# Patient Record
Sex: MALE | Race: BLACK OR AFRICAN AMERICAN | Employment: FULL TIME | URBAN - METROPOLITAN AREA
[De-identification: names, ages, dates, MRNs, and addresses within clinical notes are randomized per-mention and may not be internally consistent; named-entity substitution may affect disease eponyms.]

---

## 2019-06-20 ENCOUNTER — HOSPITAL ENCOUNTER (INPATIENT)
Age: 49
LOS: 16 days | Discharge: NURSING, REHAB OR LONG-TERM CARE | DRG: 003 | End: 2019-07-06
Attending: EMERGENCY MEDICINE | Admitting: INTERNAL MEDICINE
Payer: COMMERCIAL

## 2019-06-20 ENCOUNTER — APPOINTMENT (OUTPATIENT)
Dept: CT IMAGING | Age: 49
DRG: 003 | End: 2019-06-20
Payer: COMMERCIAL

## 2019-06-20 ENCOUNTER — APPOINTMENT (OUTPATIENT)
Dept: GENERAL RADIOLOGY | Age: 49
DRG: 003 | End: 2019-06-20
Payer: COMMERCIAL

## 2019-06-20 DIAGNOSIS — I21.3 ST ELEVATION MYOCARDIAL INFARCTION (STEMI), UNSPECIFIED ARTERY (HCC): ICD-10-CM

## 2019-06-20 DIAGNOSIS — I46.9 CARDIAC ARREST (HCC): Primary | ICD-10-CM

## 2019-06-20 LAB
A/G RATIO: 1.3 (ref 1.1–2.2)
ALBUMIN SERPL-MCNC: 4.6 G/DL (ref 3.4–5)
ALP BLD-CCNC: 68 U/L (ref 40–129)
ALT SERPL-CCNC: 101 U/L (ref 10–40)
ANION GAP SERPL CALCULATED.3IONS-SCNC: 43 MMOL/L (ref 3–16)
APTT: 40.1 SEC (ref 26–36)
AST SERPL-CCNC: 107 U/L (ref 15–37)
BANDED NEUTROPHILS RELATIVE PERCENT: 4 % (ref 0–7)
BASE EXCESS ARTERIAL: -28.4 MMOL/L (ref -3–3)
BASE EXCESS ARTERIAL: -7.4 MMOL/L (ref -3–3)
BASOPHILS ABSOLUTE: 0 K/UL (ref 0–0.2)
BASOPHILS ABSOLUTE: 0 K/UL (ref 0–0.2)
BASOPHILS RELATIVE PERCENT: 0 %
BASOPHILS RELATIVE PERCENT: 0.2 %
BILIRUB SERPL-MCNC: 0.5 MG/DL (ref 0–1)
BUN BLDV-MCNC: 14 MG/DL (ref 7–20)
CALCIUM SERPL-MCNC: 9.8 MG/DL (ref 8.3–10.6)
CARBOXYHEMOGLOBIN ARTERIAL: 0 % (ref 0–1.5)
CARBOXYHEMOGLOBIN ARTERIAL: 0.2 % (ref 0–1.5)
CHLORIDE BLD-SCNC: 94 MMOL/L (ref 99–110)
CO2: 8 MMOL/L (ref 21–32)
CREAT SERPL-MCNC: 1.5 MG/DL (ref 0.9–1.3)
EOSINOPHILS ABSOLUTE: 0 K/UL (ref 0–0.6)
EOSINOPHILS ABSOLUTE: 0.4 K/UL (ref 0–0.6)
EOSINOPHILS RELATIVE PERCENT: 0.2 %
EOSINOPHILS RELATIVE PERCENT: 5 %
GFR AFRICAN AMERICAN: >60
GFR NON-AFRICAN AMERICAN: 50
GLOBULIN: 3.5 G/DL
GLUCOSE BLD-MCNC: 309 MG/DL (ref 70–99)
HCO3 ARTERIAL: 22.3 MMOL/L (ref 21–29)
HCO3 ARTERIAL: 8.7 MMOL/L (ref 21–29)
HCT VFR BLD CALC: 46.3 % (ref 40.5–52.5)
HCT VFR BLD CALC: 48.9 % (ref 40.5–52.5)
HEMOGLOBIN, ART, EXTENDED: 14.9 G/DL (ref 13.5–17.5)
HEMOGLOBIN, ART, EXTENDED: 16.3 G/DL (ref 13.5–17.5)
HEMOGLOBIN: 15.4 G/DL (ref 13.5–17.5)
HEMOGLOBIN: 15.5 G/DL (ref 13.5–17.5)
INR BLD: 1.05 (ref 0.86–1.14)
LACTIC ACID: 26.4 MMOL/L (ref 0.4–2)
LEFT VENTRICULAR EJECTION FRACTION MODE: NORMAL
LV EF: 35 %
LYMPHOCYTES ABSOLUTE: 0.8 K/UL (ref 1–5.1)
LYMPHOCYTES ABSOLUTE: 4.8 K/UL (ref 1–5.1)
LYMPHOCYTES RELATIVE PERCENT: 5.6 %
LYMPHOCYTES RELATIVE PERCENT: 65 %
MAGNESIUM: 3.4 MG/DL (ref 1.8–2.4)
MCH RBC QN AUTO: 31 PG (ref 26–34)
MCH RBC QN AUTO: 31.9 PG (ref 26–34)
MCHC RBC AUTO-ENTMCNC: 31.5 G/DL (ref 31–36)
MCHC RBC AUTO-ENTMCNC: 33.5 G/DL (ref 31–36)
MCV RBC AUTO: 101.3 FL (ref 80–100)
MCV RBC AUTO: 92.4 FL (ref 80–100)
METAMYELOCYTES RELATIVE PERCENT: 2 %
METHEMOGLOBIN ARTERIAL: 1 %
METHEMOGLOBIN ARTERIAL: 1.2 %
MONOCYTES ABSOLUTE: 0.4 K/UL (ref 0–1.3)
MONOCYTES ABSOLUTE: 1.2 K/UL (ref 0–1.3)
MONOCYTES RELATIVE PERCENT: 5 %
MONOCYTES RELATIVE PERCENT: 8.1 %
NEUTROPHILS ABSOLUTE: 1.9 K/UL (ref 1.7–7.7)
NEUTROPHILS ABSOLUTE: 13 K/UL (ref 1.7–7.7)
NEUTROPHILS RELATIVE PERCENT: 19 %
NEUTROPHILS RELATIVE PERCENT: 85.9 %
O2 CONTENT ARTERIAL: 15 ML/DL
O2 CONTENT ARTERIAL: 18 ML/DL
O2 SAT, ARTERIAL: 64.9 %
O2 SAT, ARTERIAL: 85.5 %
O2 THERAPY: ABNORMAL
O2 THERAPY: ABNORMAL
PCO2 ARTERIAL: 59.4 MMHG (ref 35–45)
PCO2 ARTERIAL: 62.3 MMHG (ref 35–45)
PDW BLD-RTO: 13.1 % (ref 12.4–15.4)
PDW BLD-RTO: 13.9 % (ref 12.4–15.4)
PH ARTERIAL: 6.8 (ref 7.35–7.45)
PH ARTERIAL: 7.18 (ref 7.35–7.45)
PLATELET # BLD: 234 K/UL (ref 135–450)
PLATELET # BLD: 334 K/UL (ref 135–450)
PLATELET SLIDE REVIEW: ADEQUATE
PMV BLD AUTO: 8.3 FL (ref 5–10.5)
PMV BLD AUTO: 9.2 FL (ref 5–10.5)
PO2 ARTERIAL: 43.3 MMHG (ref 75–108)
PO2 ARTERIAL: 93.7 MMHG (ref 75–108)
POC ACT LR: 288 SEC
POLYCHROMASIA: ABNORMAL
POTASSIUM REFLEX MAGNESIUM: 3.1 MMOL/L (ref 3.5–5.1)
PROTHROMBIN TIME: 12 SEC (ref 9.8–13)
RBC # BLD: 4.83 M/UL (ref 4.2–5.9)
RBC # BLD: 5.01 M/UL (ref 4.2–5.9)
SMUDGE CELLS: PRESENT
SODIUM BLD-SCNC: 145 MMOL/L (ref 136–145)
TCO2 ARTERIAL: 10.6 MMOL/L
TCO2 ARTERIAL: 24.2 MMOL/L
TOTAL PROTEIN: 8.1 G/DL (ref 6.4–8.2)
TROPONIN: <0.01 NG/ML
WBC # BLD: 15.2 K/UL (ref 4–11)
WBC # BLD: 7.4 K/UL (ref 4–11)

## 2019-06-20 PROCEDURE — 83735 ASSAY OF MAGNESIUM: CPT

## 2019-06-20 PROCEDURE — 6360000002 HC RX W HCPCS

## 2019-06-20 PROCEDURE — 92941 PRQ TRLML REVSC TOT OCCL AMI: CPT

## 2019-06-20 PROCEDURE — 36592 COLLECT BLOOD FROM PICC: CPT

## 2019-06-20 PROCEDURE — 93458 L HRT ARTERY/VENTRICLE ANGIO: CPT

## 2019-06-20 PROCEDURE — 2100000000 HC CCU R&B

## 2019-06-20 PROCEDURE — 96374 THER/PROPH/DIAG INJ IV PUSH: CPT

## 2019-06-20 PROCEDURE — 92973 PRQ TRLUML C MCHN ASP THRMBC: CPT

## 2019-06-20 PROCEDURE — 2580000003 HC RX 258: Performed by: EMERGENCY MEDICINE

## 2019-06-20 PROCEDURE — 80053 COMPREHEN METABOLIC PANEL: CPT

## 2019-06-20 PROCEDURE — 2500000003 HC RX 250 WO HCPCS: Performed by: EMERGENCY MEDICINE

## 2019-06-20 PROCEDURE — 82803 BLOOD GASES ANY COMBINATION: CPT

## 2019-06-20 PROCEDURE — 94002 VENT MGMT INPAT INIT DAY: CPT

## 2019-06-20 PROCEDURE — 2709999900 HC NON-CHARGEABLE SUPPLY

## 2019-06-20 PROCEDURE — 84484 ASSAY OF TROPONIN QUANT: CPT

## 2019-06-20 PROCEDURE — 2580000003 HC RX 258: Performed by: INTERNAL MEDICINE

## 2019-06-20 PROCEDURE — 33990 INSJ PERQ VAD L HRT ARTERIAL: CPT

## 2019-06-20 PROCEDURE — 96375 TX/PRO/DX INJ NEW DRUG ADDON: CPT

## 2019-06-20 PROCEDURE — 2580000003 HC RX 258

## 2019-06-20 PROCEDURE — 93005 ELECTROCARDIOGRAM TRACING: CPT | Performed by: EMERGENCY MEDICINE

## 2019-06-20 PROCEDURE — 36415 COLL VENOUS BLD VENIPUNCTURE: CPT

## 2019-06-20 PROCEDURE — 31500 INSERT EMERGENCY AIRWAY: CPT

## 2019-06-20 PROCEDURE — 36556 INSERT NON-TUNNEL CV CATH: CPT

## 2019-06-20 PROCEDURE — 85610 PROTHROMBIN TIME: CPT

## 2019-06-20 PROCEDURE — 6370000000 HC RX 637 (ALT 250 FOR IP)

## 2019-06-20 PROCEDURE — 83605 ASSAY OF LACTIC ACID: CPT

## 2019-06-20 PROCEDURE — C1887 CATHETER, GUIDING: HCPCS

## 2019-06-20 PROCEDURE — 99222 1ST HOSP IP/OBS MODERATE 55: CPT | Performed by: INTERNAL MEDICINE

## 2019-06-20 PROCEDURE — 02HA3RZ INSERTION OF SHORT-TERM EXTERNAL HEART ASSIST SYSTEM INTO HEART, PERCUTANEOUS APPROACH: ICD-10-PCS | Performed by: INTERNAL MEDICINE

## 2019-06-20 PROCEDURE — 6360000004 HC RX CONTRAST MEDICATION: Performed by: EMERGENCY MEDICINE

## 2019-06-20 PROCEDURE — 2500000003 HC RX 250 WO HCPCS

## 2019-06-20 PROCEDURE — 70450 CT HEAD/BRAIN W/O DYE: CPT

## 2019-06-20 PROCEDURE — C1894 INTRO/SHEATH, NON-LASER: HCPCS

## 2019-06-20 PROCEDURE — B2151ZZ FLUOROSCOPY OF LEFT HEART USING LOW OSMOLAR CONTRAST: ICD-10-PCS | Performed by: INTERNAL MEDICINE

## 2019-06-20 PROCEDURE — 85730 THROMBOPLASTIN TIME PARTIAL: CPT

## 2019-06-20 PROCEDURE — 6360000002 HC RX W HCPCS: Performed by: EMERGENCY MEDICINE

## 2019-06-20 PROCEDURE — 99152 MOD SED SAME PHYS/QHP 5/>YRS: CPT

## 2019-06-20 PROCEDURE — C1769 GUIDE WIRE: HCPCS

## 2019-06-20 PROCEDURE — B2111ZZ FLUOROSCOPY OF MULTIPLE CORONARY ARTERIES USING LOW OSMOLAR CONTRAST: ICD-10-PCS | Performed by: INTERNAL MEDICINE

## 2019-06-20 PROCEDURE — 51702 INSERT TEMP BLADDER CATH: CPT

## 2019-06-20 PROCEDURE — 2720000010 HC SURG SUPPLY STERILE

## 2019-06-20 PROCEDURE — 0BH18EZ INSERTION OF ENDOTRACHEAL AIRWAY INTO TRACHEA, VIA NATURAL OR ARTIFICIAL OPENING ENDOSCOPIC: ICD-10-PCS | Performed by: EMERGENCY MEDICINE

## 2019-06-20 PROCEDURE — 6370000000 HC RX 637 (ALT 250 FOR IP): Performed by: EMERGENCY MEDICINE

## 2019-06-20 PROCEDURE — 6360000002 HC RX W HCPCS: Performed by: INTERNAL MEDICINE

## 2019-06-20 PROCEDURE — 5A0221D ASSISTANCE WITH CARDIAC OUTPUT USING IMPELLER PUMP, CONTINUOUS: ICD-10-PCS | Performed by: INTERNAL MEDICINE

## 2019-06-20 PROCEDURE — 02703DZ DILATION OF CORONARY ARTERY, ONE ARTERY WITH INTRALUMINAL DEVICE, PERCUTANEOUS APPROACH: ICD-10-PCS | Performed by: INTERNAL MEDICINE

## 2019-06-20 PROCEDURE — 4A023N7 MEASUREMENT OF CARDIAC SAMPLING AND PRESSURE, LEFT HEART, PERCUTANEOUS APPROACH: ICD-10-PCS | Performed by: INTERNAL MEDICINE

## 2019-06-20 PROCEDURE — 85347 COAGULATION TIME ACTIVATED: CPT

## 2019-06-20 PROCEDURE — 99153 MOD SED SAME PHYS/QHP EA: CPT

## 2019-06-20 PROCEDURE — 5A1955Z RESPIRATORY VENTILATION, GREATER THAN 96 CONSECUTIVE HOURS: ICD-10-PCS | Performed by: EMERGENCY MEDICINE

## 2019-06-20 PROCEDURE — 71045 X-RAY EXAM CHEST 1 VIEW: CPT

## 2019-06-20 PROCEDURE — 96361 HYDRATE IV INFUSION ADD-ON: CPT

## 2019-06-20 PROCEDURE — 99291 CRITICAL CARE FIRST HOUR: CPT

## 2019-06-20 PROCEDURE — 02C03ZZ EXTIRPATION OF MATTER FROM CORONARY ARTERY, ONE ARTERY, PERCUTANEOUS APPROACH: ICD-10-PCS | Performed by: INTERNAL MEDICINE

## 2019-06-20 PROCEDURE — 85025 COMPLETE CBC W/AUTO DIFF WBC: CPT

## 2019-06-20 PROCEDURE — C1876 STENT, NON-COA/NON-COV W/DEL: HCPCS

## 2019-06-20 RX ORDER — FAMOTIDINE 20 MG/1
20 TABLET, FILM COATED ORAL 2 TIMES DAILY
Status: DISCONTINUED | OUTPATIENT
Start: 2019-06-20 | End: 2019-07-06 | Stop reason: HOSPADM

## 2019-06-20 RX ORDER — 0.9 % SODIUM CHLORIDE 0.9 %
2000 INTRAVENOUS SOLUTION INTRAVENOUS ONCE
Status: COMPLETED | OUTPATIENT
Start: 2019-06-20 | End: 2019-06-20

## 2019-06-20 RX ORDER — ACETAMINOPHEN 325 MG/1
650 TABLET ORAL EVERY 4 HOURS PRN
Status: DISCONTINUED | OUTPATIENT
Start: 2019-06-20 | End: 2019-07-06 | Stop reason: HOSPADM

## 2019-06-20 RX ORDER — AMIODARONE HYDROCHLORIDE 50 MG/ML
300 INJECTION, SOLUTION INTRAVENOUS ONCE
Status: COMPLETED | OUTPATIENT
Start: 2019-06-20 | End: 2019-06-20

## 2019-06-20 RX ORDER — ASPIRIN 600 MG/1
300 SUPPOSITORY RECTAL ONCE
Status: COMPLETED | OUTPATIENT
Start: 2019-06-20 | End: 2019-06-20

## 2019-06-20 RX ORDER — SODIUM CHLORIDE 0.9 % (FLUSH) 0.9 %
10 SYRINGE (ML) INJECTION PRN
Status: DISCONTINUED | OUTPATIENT
Start: 2019-06-20 | End: 2019-07-06 | Stop reason: HOSPADM

## 2019-06-20 RX ORDER — ATROPINE SULFATE 0.4 MG/ML
0.5 AMPUL (ML) INJECTION
Status: ACTIVE | OUTPATIENT
Start: 2019-06-20 | End: 2019-06-20

## 2019-06-20 RX ORDER — METOPROLOL SUCCINATE 25 MG/1
25 TABLET, EXTENDED RELEASE ORAL DAILY
Status: DISCONTINUED | OUTPATIENT
Start: 2019-06-21 | End: 2019-06-23

## 2019-06-20 RX ORDER — MIDAZOLAM HYDROCHLORIDE 1 MG/ML
2 INJECTION INTRAMUSCULAR; INTRAVENOUS ONCE
Status: COMPLETED | OUTPATIENT
Start: 2019-06-20 | End: 2019-06-20

## 2019-06-20 RX ORDER — MIDAZOLAM HYDROCHLORIDE 1 MG/ML
1 INJECTION INTRAMUSCULAR; INTRAVENOUS ONCE
Status: COMPLETED | OUTPATIENT
Start: 2019-06-20 | End: 2019-06-20

## 2019-06-20 RX ORDER — FUROSEMIDE 10 MG/ML
40 INJECTION INTRAMUSCULAR; INTRAVENOUS ONCE
Status: COMPLETED | OUTPATIENT
Start: 2019-06-20 | End: 2019-06-20

## 2019-06-20 RX ORDER — HEPARIN SODIUM 1000 [USP'U]/ML
40 INJECTION, SOLUTION INTRAVENOUS; SUBCUTANEOUS PRN
Status: DISCONTINUED | OUTPATIENT
Start: 2019-06-20 | End: 2019-06-20 | Stop reason: ALTCHOICE

## 2019-06-20 RX ORDER — MIDAZOLAM HYDROCHLORIDE 1 MG/ML
2 INJECTION INTRAMUSCULAR; INTRAVENOUS ONCE
Status: DISCONTINUED | OUTPATIENT
Start: 2019-06-20 | End: 2019-06-21

## 2019-06-20 RX ORDER — SODIUM CHLORIDE 0.9 % (FLUSH) 0.9 %
10 SYRINGE (ML) INJECTION EVERY 12 HOURS SCHEDULED
Status: DISCONTINUED | OUTPATIENT
Start: 2019-06-20 | End: 2019-07-06 | Stop reason: HOSPADM

## 2019-06-20 RX ORDER — MIDAZOLAM HYDROCHLORIDE 1 MG/ML
INJECTION INTRAMUSCULAR; INTRAVENOUS
Status: COMPLETED
Start: 2019-06-20 | End: 2019-06-20

## 2019-06-20 RX ORDER — HEPARIN SODIUM 1000 [USP'U]/ML
4000 INJECTION, SOLUTION INTRAVENOUS; SUBCUTANEOUS ONCE
Status: DISCONTINUED | OUTPATIENT
Start: 2019-06-20 | End: 2019-06-20

## 2019-06-20 RX ORDER — LORAZEPAM 2 MG/ML
1 INJECTION INTRAMUSCULAR ONCE
Status: COMPLETED | OUTPATIENT
Start: 2019-06-21 | End: 2019-06-21

## 2019-06-20 RX ORDER — HEPARIN SODIUM 1000 [USP'U]/ML
80 INJECTION, SOLUTION INTRAVENOUS; SUBCUTANEOUS PRN
Status: DISCONTINUED | OUTPATIENT
Start: 2019-06-20 | End: 2019-06-20 | Stop reason: ALTCHOICE

## 2019-06-20 RX ORDER — MORPHINE SULFATE 2 MG/ML
2 INJECTION, SOLUTION INTRAMUSCULAR; INTRAVENOUS
Status: ACTIVE | OUTPATIENT
Start: 2019-06-20 | End: 2019-06-20

## 2019-06-20 RX ORDER — HEPARIN SODIUM 1000 [USP'U]/ML
80 INJECTION, SOLUTION INTRAVENOUS; SUBCUTANEOUS ONCE
Status: DISCONTINUED | OUTPATIENT
Start: 2019-06-20 | End: 2019-06-20 | Stop reason: ALTCHOICE

## 2019-06-20 RX ORDER — ATORVASTATIN CALCIUM 40 MG/1
40 TABLET, FILM COATED ORAL NIGHTLY
Status: DISCONTINUED | OUTPATIENT
Start: 2019-06-20 | End: 2019-06-23

## 2019-06-20 RX ORDER — ONDANSETRON 2 MG/ML
4 INJECTION INTRAMUSCULAR; INTRAVENOUS EVERY 6 HOURS PRN
Status: DISCONTINUED | OUTPATIENT
Start: 2019-06-20 | End: 2019-06-21 | Stop reason: SDUPTHER

## 2019-06-20 RX ORDER — SODIUM CHLORIDE 9 MG/ML
INJECTION, SOLUTION INTRAVENOUS CONTINUOUS
Status: ACTIVE | OUTPATIENT
Start: 2019-06-20 | End: 2019-06-21

## 2019-06-20 RX ORDER — HEPARIN SODIUM 10000 [USP'U]/100ML
10 INJECTION, SOLUTION INTRAVENOUS CONTINUOUS
Status: DISCONTINUED | OUTPATIENT
Start: 2019-06-20 | End: 2019-06-20

## 2019-06-20 RX ORDER — HEPARIN SODIUM 10000 [USP'U]/100ML
18 INJECTION, SOLUTION INTRAVENOUS CONTINUOUS
Status: DISCONTINUED | OUTPATIENT
Start: 2019-06-20 | End: 2019-06-20 | Stop reason: ALTCHOICE

## 2019-06-20 RX ADMIN — FUROSEMIDE 40 MG: 10 INJECTION, SOLUTION INTRAMUSCULAR; INTRAVENOUS at 23:45

## 2019-06-20 RX ADMIN — AMIODARONE HYDROCHLORIDE 300 MG: 50 INJECTION, SOLUTION INTRAVENOUS at 20:55

## 2019-06-20 RX ADMIN — IOPAMIDOL 140 ML: 755 INJECTION, SOLUTION INTRAVENOUS at 22:14

## 2019-06-20 RX ADMIN — SODIUM BICARBONATE 50 MEQ: 84 INJECTION INTRAVENOUS at 20:10

## 2019-06-20 RX ADMIN — MIDAZOLAM 2 MG: 1 INJECTION INTRAMUSCULAR; INTRAVENOUS at 20:00

## 2019-06-20 RX ADMIN — SODIUM CHLORIDE 2000 ML: 9 INJECTION, SOLUTION INTRAVENOUS at 19:30

## 2019-06-20 RX ADMIN — Medication 10 ML: at 23:30

## 2019-06-20 RX ADMIN — MIDAZOLAM 1 MG: 1 INJECTION INTRAMUSCULAR; INTRAVENOUS at 23:26

## 2019-06-20 RX ADMIN — ASPIRIN 300 MG: 600 SUPPOSITORY RECTAL at 19:48

## 2019-06-20 RX ADMIN — MIDAZOLAM: 1 INJECTION INTRAMUSCULAR; INTRAVENOUS at 19:38

## 2019-06-20 RX ADMIN — MIDAZOLAM HYDROCHLORIDE: 1 INJECTION INTRAMUSCULAR; INTRAVENOUS at 19:38

## 2019-06-20 RX ADMIN — MIDAZOLAM: 1 INJECTION INTRAMUSCULAR; INTRAVENOUS at 19:45

## 2019-06-20 RX ADMIN — FUROSEMIDE 40 MG: 10 INJECTION, SOLUTION INTRAMUSCULAR; INTRAVENOUS at 23:30

## 2019-06-20 RX ADMIN — SODIUM BICARBONATE 50 MEQ: 84 INJECTION, SOLUTION INTRAVENOUS at 23:15

## 2019-06-20 ASSESSMENT — PULMONARY FUNCTION TESTS
PIF_VALUE: 33
PIF_VALUE: 50
PIF_VALUE: 38
PIF_VALUE: 29
PIF_VALUE: 28
PIF_VALUE: 28
PIF_VALUE: 23
PIF_VALUE: 45
PIF_VALUE: 42

## 2019-06-20 NOTE — LETTER
[facility]  [address] [address]. MARIELY Chand 68            June 24, 2019        To Whom it May Concern: This note is to certify that Mr Dea Can was admitted into [facility] on June 24, 2019. He will need to transfer to another long term care facility in the near future. He will not be returning home at this time. If I can answer any questions or concerns, or if you need to speak with the Physician overseeing his care, please let me know.     Sincerely,        [address]

## 2019-06-21 PROBLEM — I21.02 STEMI INVOLVING LEFT ANTERIOR DESCENDING CORONARY ARTERY (HCC): Status: ACTIVE | Noted: 2019-06-21

## 2019-06-21 LAB
AMYLASE: 688 U/L (ref 25–115)
ANION GAP SERPL CALCULATED.3IONS-SCNC: 17 MMOL/L (ref 3–16)
ANION GAP SERPL CALCULATED.3IONS-SCNC: 20 MMOL/L (ref 3–16)
ANION GAP SERPL CALCULATED.3IONS-SCNC: 21 MMOL/L (ref 3–16)
APTT: 63.2 SEC (ref 26–36)
BASE EXCESS ARTERIAL: -14 (ref -3–3)
BASE EXCESS ARTERIAL: -2.3 MMOL/L (ref -3–3)
BASE EXCESS ARTERIAL: -4.9 MMOL/L (ref -3–3)
BASE EXCESS ARTERIAL: -6 (ref -3–3)
BASE EXCESS ARTERIAL: -7 (ref -3–3)
BASE EXCESS ARTERIAL: -7.3 MMOL/L (ref -3–3)
BUN BLDV-MCNC: 21 MG/DL (ref 7–20)
BUN BLDV-MCNC: 21 MG/DL (ref 7–20)
BUN BLDV-MCNC: 25 MG/DL (ref 7–20)
CALCIUM IONIZED: 0.82 MMOL/L (ref 1.12–1.32)
CALCIUM IONIZED: 0.99 MMOL/L (ref 1.12–1.32)
CALCIUM IONIZED: 0.99 MMOL/L (ref 1.12–1.32)
CALCIUM IONIZED: 1 MMOL/L (ref 1.12–1.32)
CALCIUM IONIZED: 1.11 MMOL/L (ref 1.12–1.32)
CALCIUM SERPL-MCNC: 6.6 MG/DL (ref 8.3–10.6)
CALCIUM SERPL-MCNC: 7.9 MG/DL (ref 8.3–10.6)
CALCIUM SERPL-MCNC: 8 MG/DL (ref 8.3–10.6)
CARBOXYHEMOGLOBIN ARTERIAL: 0.5 % (ref 0–1.5)
CARBOXYHEMOGLOBIN ARTERIAL: 0.6 % (ref 0–1.5)
CARBOXYHEMOGLOBIN ARTERIAL: 0.7 % (ref 0–1.5)
CHLORIDE BLD-SCNC: 106 MMOL/L (ref 99–110)
CHLORIDE BLD-SCNC: 108 MMOL/L (ref 99–110)
CHLORIDE BLD-SCNC: 114 MMOL/L (ref 99–110)
CHOLESTEROL, TOTAL: 282 MG/DL (ref 0–199)
CO2: 13 MMOL/L (ref 21–32)
CO2: 16 MMOL/L (ref 21–32)
CO2: 17 MMOL/L (ref 21–32)
CREAT SERPL-MCNC: 1.4 MG/DL (ref 0.9–1.3)
CREAT SERPL-MCNC: 1.5 MG/DL (ref 0.9–1.3)
CREAT SERPL-MCNC: 1.6 MG/DL (ref 0.9–1.3)
EKG ATRIAL RATE: 73 BPM
EKG ATRIAL RATE: 97 BPM
EKG DIAGNOSIS: NORMAL
EKG DIAGNOSIS: NORMAL
EKG P AXIS: 40 DEGREES
EKG P AXIS: 43 DEGREES
EKG P-R INTERVAL: 162 MS
EKG P-R INTERVAL: 178 MS
EKG Q-T INTERVAL: 386 MS
EKG Q-T INTERVAL: 400 MS
EKG QRS DURATION: 162 MS
EKG QRS DURATION: 82 MS
EKG QTC CALCULATION (BAZETT): 425 MS
EKG QTC CALCULATION (BAZETT): 508 MS
EKG R AXIS: -44 DEGREES
EKG R AXIS: -59 DEGREES
EKG T AXIS: -60 DEGREES
EKG T AXIS: -7 DEGREES
EKG VENTRICULAR RATE: 73 BPM
EKG VENTRICULAR RATE: 97 BPM
GFR AFRICAN AMERICAN: 56
GFR AFRICAN AMERICAN: >60
GFR AFRICAN AMERICAN: >60
GFR NON-AFRICAN AMERICAN: 46
GFR NON-AFRICAN AMERICAN: 50
GFR NON-AFRICAN AMERICAN: 54
GLUCOSE BLD-MCNC: 109 MG/DL (ref 70–99)
GLUCOSE BLD-MCNC: 134 MG/DL (ref 70–99)
GLUCOSE BLD-MCNC: 138 MG/DL (ref 70–99)
GLUCOSE BLD-MCNC: 145 MG/DL (ref 70–99)
GLUCOSE BLD-MCNC: 166 MG/DL (ref 70–99)
GLUCOSE BLD-MCNC: 90 MG/DL (ref 70–99)
HCO3 ARTERIAL: 12.4 MMOL/L (ref 21–29)
HCO3 ARTERIAL: 16.4 MMOL/L (ref 21–29)
HCO3 ARTERIAL: 17.7 MMOL/L (ref 21–29)
HCO3 ARTERIAL: 18.8 MMOL/L (ref 21–29)
HCO3 ARTERIAL: 20.1 MMOL/L (ref 21–29)
HCO3 ARTERIAL: 20.8 MMOL/L (ref 21–29)
HCT VFR BLD CALC: 42.5 % (ref 40.5–52.5)
HDLC SERPL-MCNC: 57 MG/DL (ref 40–60)
HEMOGLOBIN, ART, EXTENDED: 13.2 G/DL (ref 13.5–17.5)
HEMOGLOBIN, ART, EXTENDED: 13.3 G/DL (ref 13.5–17.5)
HEMOGLOBIN, ART, EXTENDED: 14.1 G/DL (ref 13.5–17.5)
HEMOGLOBIN: 14.2 G/DL (ref 13.5–17.5)
HEMOGLOBIN: 14.9 GM/DL (ref 13.5–17.5)
INR BLD: 1.2 (ref 0.86–1.14)
LACTATE: 8.1 MMOL/L (ref 0.4–2)
LACTIC ACID: 10.5 MMOL/L (ref 0.4–2)
LACTIC ACID: 5.4 MMOL/L (ref 0.4–2)
LACTIC ACID: 5.8 MMOL/L (ref 0.4–2)
LACTIC ACID: 6.4 MMOL/L (ref 0.4–2)
LDL CHOLESTEROL CALCULATED: 200 MG/DL
LV EF: 30 %
LVEF MODALITY: NORMAL
MAGNESIUM: 1.8 MG/DL (ref 1.8–2.4)
MAGNESIUM: 2.2 MG/DL (ref 1.8–2.4)
MCH RBC QN AUTO: 31.1 PG (ref 26–34)
MCHC RBC AUTO-ENTMCNC: 33.4 G/DL (ref 31–36)
MCV RBC AUTO: 93 FL (ref 80–100)
METHEMOGLOBIN ARTERIAL: 1.2 %
METHEMOGLOBIN ARTERIAL: 1.4 %
METHEMOGLOBIN ARTERIAL: 1.5 %
O2 CONTENT ARTERIAL: 18 ML/DL
O2 CONTENT ARTERIAL: 19 ML/DL
O2 CONTENT ARTERIAL: 19 ML/DL
O2 SAT, ARTERIAL: 100 % (ref 93–100)
O2 SAT, ARTERIAL: 80 % (ref 93–100)
O2 SAT, ARTERIAL: 97 % (ref 93–100)
O2 SAT, ARTERIAL: 98.6 %
O2 SAT, ARTERIAL: 99.1 %
O2 SAT, ARTERIAL: 99.1 %
O2 THERAPY: ABNORMAL
PCO2 ARTERIAL: 24.4 MM HG (ref 35–45)
PCO2 ARTERIAL: 27.3 MMHG (ref 35–45)
PCO2 ARTERIAL: 29.5 MMHG (ref 35–45)
PCO2 ARTERIAL: 29.6 MMHG (ref 35–45)
PCO2 ARTERIAL: 32.1 MM HG (ref 35–45)
PCO2 ARTERIAL: 41.2 MM HG (ref 35–45)
PDW BLD-RTO: 12.9 % (ref 12.4–15.4)
PERFORMED ON: ABNORMAL
PH ARTERIAL: 7.31 (ref 7.35–7.45)
PH ARTERIAL: 7.31 (ref 7.35–7.45)
PH ARTERIAL: 7.37 (ref 7.35–7.45)
PH ARTERIAL: 7.37 (ref 7.35–7.45)
PH ARTERIAL: 7.43 (ref 7.35–7.45)
PH ARTERIAL: 7.45 (ref 7.35–7.45)
PH VENOUS: 7.3 (ref 7.35–7.45)
PH VENOUS: 7.37 (ref 7.35–7.45)
PH VENOUS: 7.38 (ref 7.35–7.45)
PH VENOUS: 7.44 (ref 7.35–7.45)
PHOSPHORUS: 3 MG/DL (ref 2.5–4.9)
PHOSPHORUS: 4.3 MG/DL (ref 2.5–4.9)
PHOSPHORUS: 4.6 MG/DL (ref 2.5–4.9)
PLATELET # BLD: 279 K/UL (ref 135–450)
PMV BLD AUTO: 8.2 FL (ref 5–10.5)
PO2 ARTERIAL: 100 MM HG (ref 75–108)
PO2 ARTERIAL: 147 MMHG (ref 75–108)
PO2 ARTERIAL: 148 MMHG (ref 75–108)
PO2 ARTERIAL: 198 MMHG (ref 75–108)
PO2 ARTERIAL: 233.1 MM HG (ref 75–108)
PO2 ARTERIAL: 47.1 MM HG (ref 75–108)
POC HEMATOCRIT: 44 % (ref 40.5–52.5)
POC POTASSIUM: 3.4 MMOL/L (ref 3.5–5.1)
POC SAMPLE TYPE: ABNORMAL
POC SODIUM: 145 MMOL/L (ref 136–145)
POTASSIUM SERPL-SCNC: 2.8 MMOL/L (ref 3.5–5.1)
POTASSIUM SERPL-SCNC: 2.9 MMOL/L (ref 3.5–5.1)
POTASSIUM SERPL-SCNC: 4.5 MMOL/L (ref 3.5–5.1)
PROTHROMBIN TIME: 13.7 SEC (ref 9.8–13)
RBC # BLD: 4.57 M/UL (ref 4.2–5.9)
SODIUM BLD-SCNC: 144 MMOL/L (ref 136–145)
TCO2 ARTERIAL: 13 MMOL/L
TCO2 ARTERIAL: 17.3 MMOL/L
TCO2 ARTERIAL: 18.5 MMOL/L
TCO2 ARTERIAL: 20 MMOL/L
TCO2 ARTERIAL: 21 MMOL/L
TCO2 ARTERIAL: 22 MMOL/L
TRIGL SERPL-MCNC: 124 MG/DL (ref 0–150)
VLDLC SERPL CALC-MCNC: 25 MG/DL
WBC # BLD: 14.3 K/UL (ref 4–11)

## 2019-06-21 PROCEDURE — 84100 ASSAY OF PHOSPHORUS: CPT

## 2019-06-21 PROCEDURE — 2580000003 HC RX 258: Performed by: INTERNAL MEDICINE

## 2019-06-21 PROCEDURE — 85014 HEMATOCRIT: CPT

## 2019-06-21 PROCEDURE — 92973 PRQ TRLUML C MCHN ASP THRMBC: CPT | Performed by: INTERNAL MEDICINE

## 2019-06-21 PROCEDURE — 93308 TTE F-UP OR LMTD: CPT

## 2019-06-21 PROCEDURE — 82947 ASSAY GLUCOSE BLOOD QUANT: CPT

## 2019-06-21 PROCEDURE — 83735 ASSAY OF MAGNESIUM: CPT

## 2019-06-21 PROCEDURE — 36600 WITHDRAWAL OF ARTERIAL BLOOD: CPT

## 2019-06-21 PROCEDURE — 85027 COMPLETE CBC AUTOMATED: CPT

## 2019-06-21 PROCEDURE — 82803 BLOOD GASES ANY COMBINATION: CPT

## 2019-06-21 PROCEDURE — 6360000002 HC RX W HCPCS: Performed by: NURSE PRACTITIONER

## 2019-06-21 PROCEDURE — 85610 PROTHROMBIN TIME: CPT

## 2019-06-21 PROCEDURE — 36620 INSERTION CATHETER ARTERY: CPT

## 2019-06-21 PROCEDURE — 2500000003 HC RX 250 WO HCPCS: Performed by: PSYCHIATRY & NEUROLOGY

## 2019-06-21 PROCEDURE — 2500000003 HC RX 250 WO HCPCS: Performed by: INTERNAL MEDICINE

## 2019-06-21 PROCEDURE — 92928 PRQ TCAT PLMT NTRAC ST 1 LES: CPT | Performed by: INTERNAL MEDICINE

## 2019-06-21 PROCEDURE — 37799 UNLISTED PX VASCULAR SURGERY: CPT

## 2019-06-21 PROCEDURE — 85730 THROMBOPLASTIN TIME PARTIAL: CPT

## 2019-06-21 PROCEDURE — 33990 INSJ PERQ VAD L HRT ARTERIAL: CPT | Performed by: INTERNAL MEDICINE

## 2019-06-21 PROCEDURE — 99291 CRITICAL CARE FIRST HOUR: CPT | Performed by: INTERNAL MEDICINE

## 2019-06-21 PROCEDURE — 83051 HEMOGLOBIN PLASMA: CPT

## 2019-06-21 PROCEDURE — 85347 COAGULATION TIME ACTIVATED: CPT

## 2019-06-21 PROCEDURE — 84132 ASSAY OF SERUM POTASSIUM: CPT

## 2019-06-21 PROCEDURE — 82150 ASSAY OF AMYLASE: CPT

## 2019-06-21 PROCEDURE — 80048 BASIC METABOLIC PNL TOTAL CA: CPT

## 2019-06-21 PROCEDURE — 6360000002 HC RX W HCPCS: Performed by: INTERNAL MEDICINE

## 2019-06-21 PROCEDURE — 6360000002 HC RX W HCPCS: Performed by: HOSPITALIST

## 2019-06-21 PROCEDURE — 82330 ASSAY OF CALCIUM: CPT

## 2019-06-21 PROCEDURE — 80061 LIPID PANEL: CPT

## 2019-06-21 PROCEDURE — 93010 ELECTROCARDIOGRAM REPORT: CPT | Performed by: INTERNAL MEDICINE

## 2019-06-21 PROCEDURE — 93458 L HRT ARTERY/VENTRICLE ANGIO: CPT | Performed by: INTERNAL MEDICINE

## 2019-06-21 PROCEDURE — 84295 ASSAY OF SERUM SODIUM: CPT

## 2019-06-21 PROCEDURE — 2580000003 HC RX 258: Performed by: NURSE PRACTITIONER

## 2019-06-21 PROCEDURE — 2700000000 HC OXYGEN THERAPY PER DAY

## 2019-06-21 PROCEDURE — 2100000000 HC CCU R&B

## 2019-06-21 PROCEDURE — 6370000000 HC RX 637 (ALT 250 FOR IP): Performed by: INTERNAL MEDICINE

## 2019-06-21 PROCEDURE — 2580000003 HC RX 258: Performed by: PSYCHIATRY & NEUROLOGY

## 2019-06-21 PROCEDURE — 83605 ASSAY OF LACTIC ACID: CPT

## 2019-06-21 RX ORDER — 0.9 % SODIUM CHLORIDE 0.9 %
30 INTRAVENOUS SOLUTION INTRAVENOUS ONCE
Status: DISCONTINUED | OUTPATIENT
Start: 2019-06-21 | End: 2019-06-21

## 2019-06-21 RX ORDER — MAGNESIUM SULFATE IN WATER 40 MG/ML
2 INJECTION, SOLUTION INTRAVENOUS ONCE
Status: COMPLETED | OUTPATIENT
Start: 2019-06-21 | End: 2019-06-21

## 2019-06-21 RX ORDER — ONDANSETRON 2 MG/ML
4 INJECTION INTRAMUSCULAR; INTRAVENOUS EVERY 6 HOURS PRN
Status: DISCONTINUED | OUTPATIENT
Start: 2019-06-21 | End: 2019-07-06 | Stop reason: HOSPADM

## 2019-06-21 RX ORDER — DEXTROSE MONOHYDRATE 25 G/50ML
12.5 INJECTION, SOLUTION INTRAVENOUS PRN
Status: DISCONTINUED | OUTPATIENT
Start: 2019-06-21 | End: 2019-07-06 | Stop reason: HOSPADM

## 2019-06-21 RX ORDER — FUROSEMIDE 10 MG/ML
40 INJECTION INTRAMUSCULAR; INTRAVENOUS DAILY
Status: DISCONTINUED | OUTPATIENT
Start: 2019-06-22 | End: 2019-06-23

## 2019-06-21 RX ORDER — MORPHINE SULFATE 2 MG/ML
4 INJECTION, SOLUTION INTRAMUSCULAR; INTRAVENOUS
Status: DISCONTINUED | OUTPATIENT
Start: 2019-06-21 | End: 2019-06-21

## 2019-06-21 RX ORDER — 0.9 % SODIUM CHLORIDE 0.9 %
250 INTRAVENOUS SOLUTION INTRAVENOUS ONCE
Status: COMPLETED | OUTPATIENT
Start: 2019-06-21 | End: 2019-06-21

## 2019-06-21 RX ORDER — LORAZEPAM 2 MG/ML
2 INJECTION INTRAMUSCULAR
Status: DISCONTINUED | OUTPATIENT
Start: 2019-06-21 | End: 2019-06-21

## 2019-06-21 RX ORDER — POTASSIUM CHLORIDE 29.8 MG/ML
20 INJECTION INTRAVENOUS
Status: COMPLETED | OUTPATIENT
Start: 2019-06-21 | End: 2019-06-21

## 2019-06-21 RX ORDER — DEXTROSE MONOHYDRATE 50 MG/ML
100 INJECTION, SOLUTION INTRAVENOUS PRN
Status: DISCONTINUED | OUTPATIENT
Start: 2019-06-21 | End: 2019-07-06 | Stop reason: HOSPADM

## 2019-06-21 RX ORDER — PROPOFOL 10 MG/ML
10 INJECTION, EMULSION INTRAVENOUS CONTINUOUS
Status: DISCONTINUED | OUTPATIENT
Start: 2019-06-21 | End: 2019-06-21

## 2019-06-21 RX ORDER — MORPHINE SULFATE 2 MG/ML
INJECTION, SOLUTION INTRAMUSCULAR; INTRAVENOUS
Status: DISCONTINUED
Start: 2019-06-21 | End: 2019-06-21

## 2019-06-21 RX ORDER — CHLORHEXIDINE GLUCONATE 0.12 MG/ML
15 RINSE ORAL 2 TIMES DAILY
Status: DISCONTINUED | OUTPATIENT
Start: 2019-06-21 | End: 2019-07-06 | Stop reason: HOSPADM

## 2019-06-21 RX ORDER — FENTANYL CITRATE 50 UG/ML
25 INJECTION, SOLUTION INTRAMUSCULAR; INTRAVENOUS
Status: DISCONTINUED | OUTPATIENT
Start: 2019-06-21 | End: 2019-06-21

## 2019-06-21 RX ORDER — ASPIRIN 81 MG/1
81 TABLET, CHEWABLE ORAL DAILY
Status: DISCONTINUED | OUTPATIENT
Start: 2019-06-21 | End: 2019-07-06 | Stop reason: HOSPADM

## 2019-06-21 RX ORDER — FUROSEMIDE 10 MG/ML
40 INJECTION INTRAMUSCULAR; INTRAVENOUS ONCE
Status: COMPLETED | OUTPATIENT
Start: 2019-06-21 | End: 2019-06-21

## 2019-06-21 RX ORDER — NICOTINE POLACRILEX 4 MG
15 LOZENGE BUCCAL PRN
Status: DISCONTINUED | OUTPATIENT
Start: 2019-06-21 | End: 2019-07-06 | Stop reason: HOSPADM

## 2019-06-21 RX ADMIN — ATORVASTATIN CALCIUM 40 MG: 40 TABLET, FILM COATED ORAL at 20:20

## 2019-06-21 RX ADMIN — FUROSEMIDE 40 MG: 10 INJECTION, SOLUTION INTRAMUSCULAR; INTRAVENOUS at 15:23

## 2019-06-21 RX ADMIN — CHLORHEXIDINE GLUCONATE 0.12% ORAL RINSE 15 ML: 1.2 LIQUID ORAL at 12:51

## 2019-06-21 RX ADMIN — CHLORHEXIDINE GLUCONATE 0.12% ORAL RINSE 15 ML: 1.2 LIQUID ORAL at 20:19

## 2019-06-21 RX ADMIN — FAMOTIDINE 20 MG: 20 TABLET ORAL at 12:50

## 2019-06-21 RX ADMIN — LORAZEPAM 1 MG: 2 INJECTION INTRAMUSCULAR; INTRAVENOUS at 00:03

## 2019-06-21 RX ADMIN — LORAZEPAM 2 MG: 2 INJECTION INTRAMUSCULAR; INTRAVENOUS at 01:00

## 2019-06-21 RX ADMIN — ASPIRIN 81 MG 81 MG: 81 TABLET ORAL at 12:32

## 2019-06-21 RX ADMIN — AMIODARONE HYDROCHLORIDE 1 MG/MIN: 50 INJECTION, SOLUTION INTRAVENOUS at 00:40

## 2019-06-21 RX ADMIN — TICAGRELOR 90 MG: 90 TABLET ORAL at 20:20

## 2019-06-21 RX ADMIN — MORPHINE SULFATE 4 MG: 2 INJECTION, SOLUTION INTRAMUSCULAR; INTRAVENOUS at 04:47

## 2019-06-21 RX ADMIN — NOREPINEPHRINE BITARTRATE 10 MCG/MIN: 1 INJECTION INTRAVENOUS at 01:57

## 2019-06-21 RX ADMIN — HEPARIN SODIUM 25000 UNITS: 10000 INJECTION INTRAVENOUS; SUBCUTANEOUS at 02:35

## 2019-06-21 RX ADMIN — MAGNESIUM SULFATE HEPTAHYDRATE 2 G: 40 INJECTION, SOLUTION INTRAVENOUS at 14:13

## 2019-06-21 RX ADMIN — VALPROATE SODIUM 500 MG: 100 INJECTION, SOLUTION INTRAVENOUS at 20:18

## 2019-06-21 RX ADMIN — SODIUM CHLORIDE 250 ML: 9 INJECTION, SOLUTION INTRAVENOUS at 21:10

## 2019-06-21 RX ADMIN — AMIODARONE HYDROCHLORIDE 0.5 MG/MIN: 50 INJECTION, SOLUTION INTRAVENOUS at 20:59

## 2019-06-21 RX ADMIN — FAMOTIDINE 20 MG: 20 TABLET ORAL at 20:20

## 2019-06-21 RX ADMIN — TICAGRELOR 180 MG: 90 TABLET ORAL at 12:32

## 2019-06-21 RX ADMIN — CALCIUM GLUCONATE 2 G: 98 INJECTION, SOLUTION INTRAVENOUS at 14:38

## 2019-06-21 RX ADMIN — Medication 10 ML: at 20:20

## 2019-06-21 RX ADMIN — Medication 20 MEQ: at 16:00

## 2019-06-21 RX ADMIN — Medication 10 ML: at 12:51

## 2019-06-21 RX ADMIN — Medication 20 MEQ: at 15:11

## 2019-06-21 RX ADMIN — LORAZEPAM 2 MG: 2 INJECTION INTRAMUSCULAR; INTRAVENOUS at 05:05

## 2019-06-21 RX ADMIN — Medication 100 MEQ: at 15:11

## 2019-06-21 RX ADMIN — VALPROATE SODIUM 1650 MG: 100 INJECTION, SOLUTION INTRAVENOUS at 12:32

## 2019-06-21 RX ADMIN — SODIUM CHLORIDE 2.22 UNITS/HR: 9 INJECTION, SOLUTION INTRAVENOUS at 03:06

## 2019-06-21 RX ADMIN — PROPOFOL 10 MCG/KG/MIN: 10 INJECTION, EMULSION INTRAVENOUS at 01:09

## 2019-06-21 RX ADMIN — CALCIUM CHLORIDE 2 G: 100 INJECTION INTRAVENOUS; INTRAVENTRICULAR at 03:03

## 2019-06-21 RX ADMIN — Medication 20 MEQ: at 14:09

## 2019-06-21 ASSESSMENT — PULMONARY FUNCTION TESTS
PIF_VALUE: 26
PIF_VALUE: 9
PIF_VALUE: 11
PIF_VALUE: 8
PIF_VALUE: 9
PIF_VALUE: 15
PIF_VALUE: 22
PIF_VALUE: 7
PIF_VALUE: 24
PIF_VALUE: 21
PIF_VALUE: 8
PIF_VALUE: 8
PIF_VALUE: 36
PIF_VALUE: 15
PIF_VALUE: 26
PIF_VALUE: 7
PIF_VALUE: 8
PIF_VALUE: 22
PIF_VALUE: 31
PIF_VALUE: 23
PIF_VALUE: 8
PIF_VALUE: 39
PIF_VALUE: 6
PIF_VALUE: 29
PIF_VALUE: 8
PIF_VALUE: 43
PIF_VALUE: 44
PIF_VALUE: 31
PIF_VALUE: 25
PIF_VALUE: 8
PIF_VALUE: 9
PIF_VALUE: 26
PIF_VALUE: 6
PIF_VALUE: 8
PIF_VALUE: 24
PIF_VALUE: 18
PIF_VALUE: 26
PIF_VALUE: 8
PIF_VALUE: 22
PIF_VALUE: 11
PIF_VALUE: 36
PIF_VALUE: 23
PIF_VALUE: 15
PIF_VALUE: 36
PIF_VALUE: 26
PIF_VALUE: 7

## 2019-06-21 NOTE — PROGRESS NOTES
0700: Handoff report received from [address] [facility]. Pt seen. Family at bedside. Pt has been removed from all treatment according to wishes of wife. Pt is unresponsive to stimuli including pain. Pupils are 1-2 round and unresponsive. Corneal responses are absent. Myoclonic jerking prominent. 0745: Dr. Nereida Fabian at bed side discussing pt care with family. Consult to Neurology. 0800: Assessment complete. NSR on monitor. Pt remains unresponsive. 9239: Impella device removed. Pressure held and Femstop device placed at high pressure. 9912: Hemostasis achieved. Blood drawn and sent to lab. 0945: Ventilator was turned back on per order. 1833Bgianna Quinones NP at bedside for neuro eval. Family brought in and were updated on findings. 1030: OG tube was advanced to 65 and secured to the ETT. 1130: Critical care team at bedside rounding. 1200: Blood drawn and sent to lab.  1301: Reassessment complete. Pt remains unresponsive to any stimuli. 1308: Per LETI Castillo CNP - hold morphine and ativan at this time. 1319: Critical care team alerted to critical values. 1430: Orders received and electrolyte replacements hung. Dr. Jc Carpenter at bedside reiterating findings to the family. 1730: Blood drawn and sent to lab.  1900: Handoff report given to Joleen Zhou.

## 2019-06-21 NOTE — PROGRESS NOTES
Increased RR to 30 per Dr Geeta Mooney.     Electronically signed by Giovanni Montejo RCP on 6/20/19 at 11:33 PM

## 2019-06-21 NOTE — H&P
CARDIOLOGY  CONSULTATION         Reason for Consultation/Chief Complaint: \"Cardiac Arrest with Acute anterior MI.  CPR and ACLS with ROSC. \"       History of Present Illness: Naida Brenner is a 51 y.o. patient who presented to the hospital with complaints of cardiac arrest and was last seen 30 minutes earlier. He had 5 rounds of Epi and developed ROSC. CT head showed no acute pathology per radiology. ECG showed acute anterior MI. The patient is unresponsive. PH on presentation was 6.8 and lactate was 26. Gisela Reed Past Medical History:   has a past medical history of Mixed hyperlipidemia and Normal delivery. Surgical History:   has no past surgical history on file. Social History:   reports that he has never smoked. He has never used smokeless tobacco. He reports that he does not drink alcohol or use drugs. Family History:  No evidence for sudden cardiac death or premature CAD    Home Medications:  Were reviewed and are listed in nursing record. and/or listed below  Prior to Admission medications    Not on [facility] Medications:   heparin (porcine)  4,000 Units Intravenous Once    midazolam  2 mg Intravenous Once        heparin (porcine)         Allergies:  Patient has no known allergies. Review of Systems:   Not done    Physical Examination:    Vitals:    06/20/19 2035   BP:    Pulse: 97   Resp: (!) 38   Temp:    SpO2:    110/70  . General Appearance:  Intubated unresponsive. Head:  Normocephalic,   Eyes:  PERRL anicteric   Nose: Nares normal,   Neck: Supple, JVP elevated    Lungs:   diminshed rales   Chest Wall:  No tenderness or deformity   Heart:  Regular rate and rhythm, distantS1, S2 normal, no murmur,   No rub.  No S3 / S4  gallop   Abdomen:   Soft, non-tender, +bowel sounds   Extremities: no cyanosis, no clubbing  no LE edema   Pulses: Symmetric  extremities   Skin:  no gross lesions or rashes   Pysch: Unable to assess   Neurologic: Gag and cough  Pupils

## 2019-06-21 NOTE — CONSULTS
Cardiac rehab phase I referral.  Given the situation of patients status it is not appropriate for consult.  Thank Duane Walker R.N. .

## 2019-06-21 NOTE — PROGRESS NOTES
6/20/19  @2250-Pt arrived to CVU from cath lab. Impella intact and in place to R femoral artery. Catheter measured at 91cm. Site bleeding. Manual pressure being held. Connected to CVU monitors. On Ventilator at 100% FiO2, O2 sat 75%. Pt unresponsive to pain. No pupil or corneal reflex noted. Pt posturing and presenting myoclonic jerks. Pt breathing over vent. Pulmonology consulted per Dr. Tere Bolanos for ventilator management. Steven Hummel representative at bedside. Dr. Tere Bolanos at bedside to place central line. @2303-Pulmonology consult called. Dr. Saravanan Crowder covering, waiting for response. @2306-STAT ABG drawn and sent. @12-This RN spoke with Dr. Saravanan Crowder regarding ventilator management. Updated on pt's hemodynamic and neurological status. All questions answered. Ventilator changes made, see flow sheet. Order to draw ABG in 45 minutes and notify him of results. @2311-Hospitalist consult, Dr. Mansoor Huertas covering. @2315-Call returned from Dr. Mansoor Huertas. Updated on pt's status and questions answered. Dr. Mansoor Huertas stated Christy Taylor are you consulting me. \"  This RN explained I had received an order from Dr. Tere Bolanos to consult for medical management. Dr. Mansoor Huertas verbalized understanding and stated \"I was aware of this patient when he came into the ED. \"  This RN asked if he would be able to place a central line. Dr. Mansoor Huertas stated \"I do not place lines. \"  RN verbalizes understanding. @2330-Left femoral CVC triple lumen placed at bedside by Dr. Tere Bolanos. Blood return noted. Order to transduce CVP.    @0015-Left radial arterial line placed at bedside by Dr. Tere Bolanos. Intact and in place. Appropriate waveform noted. @0030-Full assessment completed, see flow sheet. Pt intubated on ventilator, ETT @ 29cm. OG intact and in place, @ 59cm. OG connected to low-wall continuous suction per Dr. Tere Bolanos. Lung sounds clear to auscultation. No gag reflex noted. No pupil or corneal reflexes.   Myoclonic jerks remain present. Palpable radial pulses. Doppler post-tib and pedal pulses. Skin cool and dry. Bloody/cherry urine output. MD velasco at [facility] along with [facility], Steven representative. @0040-ABG drawn and ran on EPOC. Dr. Iban Smith notified of results. No new orders. Aggrastat gtt stopped per Dr. Maxine Akbar. @0100-Hypothermia initiated. @0135-STAT labs drawn and sent. @12-This RN spoke with Dr. Maxine Akbar. Updated on pt's status and all questions answered. No new orders at this time. @0250-Upon assessment. Pedal and post-tib pulses are now absent. @0322-ABG drawn and ran on EPOC device. @0330-Dr. Iban Smith paged to notify of current ABG results. Waiting for response. Dr. Iban Smith returned phone call. Updated on pt's ABG results and hemodynamic status. Stated \"I'm not sure about that pO2 result. \"  No new orders placed or ventilator changes made at this time. Will cont to monitor. @0333-[facility] notified of pt per Dona Jackson RN. See progress note. @0348-Call returned from [facility]. This RN spoke with Pranav. Updated on pt's status, VS, medical history, and answered all questions. Pranav stated will be following patient and to call if there are changes. @0401-Stefany Davis attempted to be reached directly by this RN to inform of pt's hemodynamic status. Message left. Waiting for response. @0410-Dr. Solis at bedside to speak with family. @0426-Family has elected to withdraw at this point. Wife and other family members spoke in depth with Dr. Savanah Conley. This RN was face-to-face with wife at eye level and blantently asked \"You wish me to turn off the cardiac device, the breathing machine, and all medications. \"  The wife responded with \"Yes, turn it all off. \"  This RN went on to state \"Are you sure you wish me stop everything we are currently doing. The patients wife responded with \"Yes, stop it, I'm sure. \"  This was witnessed by Dona Jackson RN who was at the bedside also.   Call

## 2019-06-21 NOTE — CONSULTS
See previous consultation note. Thank you.       Laila Barry NP  [address] [place] [address] [address] Neurology

## 2019-06-21 NOTE — PROGRESS NOTES
Hospitalist Progress Note      PCP: Uri Lee MD    Date of Admission: 6/20/2019    Chief Complaint: [facility] Course: Patient brought to ED after being found down for approx 30 minutes. Patient found pulseless and in V fib by EMS. CPR initiated and multiple rounds of epinephrine given. Patient was given Amio bolus, bicarb and narcan prior to arrival. Once in ED patient was found to have EKG with marked ST elevations. Lactic acid was 26, pH was 6.8. Cardiology was consulted and performed emergent LHC and found LAD bloackage with EF of 30%, thrombectomy also performed. After impella, central line and art line were placed patients family decided to withdraw care. Patient was undergoing hypothermia protocol at the time so all care was stopped. Three hours later patients family/POA/wife asked that all care be restarted. Patient was placed back on the ventilator after ABG showed poor oxygenation and patient's inability to maintain his own airway. Subjective: Patient seen and examined. Long conversations had with nursing, pulmonology, cardiology, patients Wife and POA, oldest brother, sister. Family stating they would like all care to be given. Patients family made aware of overall poor prognosis.  Patient is currently not showing any signs of recovery but he is only 12 hours post arrest. Family made aware that hypothermia protocol was his best bet at a meaningful recovery and that was stopped last night at family's request. As of now they would like to continue current plan, management of aggressive care and will discuss over th next few days as likely permanent neurological status emerges       Medications:  Reviewed    Infusion Medications    amiodarone      norepinephrine (LEVOPHED) infusion Stopped (06/21/19 0500)    dextrose      insulin (HUMAN R) non-weight based infusion Stopped (06/21/19 0500)    propofol Stopped (06/21/19 0500)    tirofiban Stopped (06/20/19 2340)    heparin

## 2019-06-21 NOTE — ED PROVIDER NOTES
CHIEF COMPLAINT  Cardiac Arrest (working out in the basement; unknown downtime last seen 40 minutes prior to ems arrival by family)      [address] [facility]  Irina Conley is a 51 y.o. male who presents to the ED in cardiac arrest with EMS. Patient was found unresponsive at home by family members. Patient was not breathing at the time. EMS states on their arrival, patient had no pulse and was found to be in V. fib. Patient had CPR started and they were 5 rounds of epinephrine given prior to arrival.  Unknown downtime but family member saw patient approximately 40 minutes prior to finding him unresponsive. Patient was working out prior to going unresponsive. Patient was given 300 amiodarone for refractory V. fib, bicarb and Narcan prior to arrival.  No past medical history per EMS. Please see ED course below for events that followed patient arrival in the emergency room. No other complaints, modifying factors or associated symptoms. Nursing notes reviewed. Past Medical History:   Diagnosis Date    Mixed hyperlipidemia 2/18/2016    Normal delivery     Term     No past surgical history on file.   Family History   Problem Relation Age of Onset    Rheum Arthritis Neg Hx     Osteoarthritis Neg Hx     Asthma Neg Hx     Cancer Neg Hx     Diabetes Neg Hx     Heart Failure Neg Hx     High Cholesterol Neg Hx     Hypertension Neg Hx     Migraines Neg Hx     Rashes/Skin Problems Neg Hx     Seizures Neg Hx     Stroke Neg Hx     Thyroid Disease Neg Hx      Social History     Socioeconomic History    Marital status:      Spouse name: Not on file    Number of children: Not on file    Years of education: Not on file    Highest education level: Not on file   Occupational History    Occupation: [employer]    Social Needs    Financial resource strain: Not on file    Food insecurity:     Worry: Not on file     Inability: Not on file    Transportation needs:     Medical: Not on file     Non-medical: Not on file   Tobacco Use    Smoking status: Never Smoker    Smokeless tobacco: Never Used   Substance and Sexual Activity    Alcohol use: No    Drug use: No    Sexual activity: Yes   Lifestyle    Physical activity:     Days per week: Not on file     Minutes per session: Not on file    Stress: Not on file   Relationships    Social connections:     Talks on phone: Not on file     Gets together: Not on file     Attends Muslim service: Not on file     Active member of club or organization: Not on file     Attends meetings of clubs or organizations: Not on file     Relationship status: Not on file    Intimate partner violence:     Fear of current or ex partner: Not on file     Emotionally abused: Not on file     Physically abused: Not on file     Forced sexual activity: Not on file   Other Topics Concern    Not on file   Social History Narrative    Not on file     Current Facility-Administered Medications   Medication Dose Route Frequency Provider Last Rate Last Dose    heparin (porcine) injection 4,000 Units  4,000 Units Intravenous Once Akash Hill MD        heparin 25,000 units in dextrose 5% 250 mL infusion  10 mL/hr Intravenous Continuous Akash Hill MD        midazolam (VERSED) injection 2 mg  2 mg Intravenous Once Akash Hill MD         No current outpatient medications on file.      No Known Allergies      REVIEW OF SYSTEMS  10 systems reviewed, pertinent positives per HPI otherwise noted to be negative    PHYSICAL EXAM  /68   Pulse 97   Temp 97.6 °F (36.4 °C) (Rectal)   Resp (!) 38   Wt 187 lb 13.3 oz (85.2 kg)   SpO2 100%   BMI 24.12 kg/m²      CONSTITUTIONAL:  Unresponsive, LMA in place, no purposeful movement   HEAD: normocephalic, atraumatic   EYES:  No ocular movement, pupils 3 mm and nonreactive   ENT: Moist mucous membranes, LMA in place   NECK: Symmetric, trachea midline   BACK: Symmetric, no deformity   LUNGS: Bilateral breath sounds, coarse breath sounds throughout all lung fields   CARDIOVASCULAR: RRR, normal S1/S2, weak carotid and femoral pulse palpated bilaterally   ABDOMEN: Soft, non-tender, non-distended, +BS   NEUROLOGIC:   No purposeful movement of the extremities, no response to pain stimulus, GCS 3T, no corneal reflex   MUSCULOSKELETAL: No clubbing, cyanosis or edema   SKIN: No rash, pallor or wounds         RADIOLOGY  X-RAYS:  I have reviewed radiologic plain film image(s). ALL OTHER NON-PLAIN FILM IMAGES SUCH AS CT, ULTRASOUND AND MRI HAVE BEEN READ BY THE RADIOLOGIST. CT HEAD WO CONTRAST   Final Result   No acute intracranial abnormality. XR CHEST PORTABLE   Final Result   1. Diffuse pulmonary edema. 2. Endotracheal tube tip is located 3.5 cm above the tarun. EKG INTERPRETATION  1920  The Ekg interpreted by me shows  normal sinus rhythm with a rate of 73  Axis is   Left axis deviation  QTc is  normal  Intervals and Durations are unremarkable. ST Segments: elevation in  v1, v2, v3, v4 and aVl and depression in  v5, v6, II, III and aVf  No previous EKG on file. Patient's EKG consistent with acute STEMI in the anterior/lateral leads    1952  The Ekg interpreted by me shows  normal sinus rhythm with a rate of 97  Axis is   Left axis deviation  QTc is  prolonged at 508  Intervals and Durations are unremarkable. ST Segments: elevation in  v1, v2, v3 and aVr and depression in  v4, v5, v6, II, III and aVf  Patient's EKG consistently shows elevation in the anterior lateral leads with reciprocal change in the inferior leads consistent with anterior lateral infarct. PROCEDURES    ED COURSE/MDM  Cardiac arrest, STEMI    ED Course as of Jun 20 2247   Thu Jun 20, 2019 1918 Patient arrived with compressions via  [facility] in process. Patient placed on monitor and transferred to bed. [DS]   1920 Pulse palpated on pulse check. EKG performed which did show STEMI.   Cardiology consult to interventional

## 2019-06-21 NOTE — PROGRESS NOTES
regular (HUMULIN R;NOVOLIN R) 100 Units in sodium chloride 0.9 % 100 mL infusion Continuous   propofol 1000 MG/100ML injection Continuous   morphine (PF) injection 4 mg Q1H PRN   morphine 2 MG/ML injection    midazolam (VERSED) injection 2 mg Once   sodium chloride flush 0.9 % injection 10 mL 2 times per day   sodium chloride flush 0.9 % injection 10 mL PRN   acetaminophen (TYLENOL) tablet 650 mg Q4H PRN   magnesium hydroxide (MILK OF MAGNESIA) 400 MG/5ML suspension 30 mL Daily PRN   famotidine (PEPCID) tablet 20 mg BID   atorvastatin (LIPITOR) tablet 40 mg Nightly   metoprolol succinate (TOPROL XL) extended release tablet 25 mg Daily   ticagrelor (BRILINTA) tablet 90 mg BID   tirofiban (AGGRASTAT) IV bolus 2,130 mcg Once   tirofiban (AGGRASTAT) 50 mcg/mL infusion Continuous   heparin (porcine) 25,000 Units in dextrose 5 % 500 mL infusion Continuous       Allergies:  Patient has no known allergies. Review of Systems: SEE HPI   · Cannot be obtained      Objective:     PHYSICAL EXAM:      Vitals:    06/21/19 0630   BP: 107/64   Pulse: 78   Resp: 27   Temp:    SpO2: (!) 88%    Weight: 181 lb 14.1 oz (82.5 kg)       General Appearance:   Patient is intubated unresponsive. Head:  Normocephalic, without obvious abnormality, atraumatic. Eyes:   Pupils are not reactive. Mouth: Moist mucosa, no pharyngeal erythema. Nose: Nares normal. No drainage or sinus tenderness. Neck: Supple, symmetrical, trachea midline. No adenopathy. No tenderness/mass/nodules. No carotid bruit or elevated JVD. Lungs:   Respiratory Effort: Normal   Auscultation: Clear to auscultation bilaterally, respirations unlabored. No wheeze, rales   Chest Wall:  No tenderness or deformity. Cardiovascular:    Pulses  Palpation: normal   Ascultation: Regular rate, S1/ S2 normal. No murmur, rub, or gallop. 2+ radial and pedal pulses, symmetric  Carotid  Femoral   Abdomen and Gastrointestinal:   Soft, non-tender, bowel sounds active.   Liver had percutaneous intervention performed by Dr. Banks Salvage a bare-metal stent was placed after thrombectomy. Patient is in cardiogenic shock. He had cardiac arrest.  Had ventricular tachycardia. Impella has been removed now. Acute respiratory failure hypoxemic on ventilator support. Hypotension is stable pressors have been weaned off. Neurological status patient remains unresponsive has encephalopathy. Will consult neurology. Continue dual antiplatelet therapy. We will start beta-blocker if his blood pressure remains stable beta-blocker was held secondary to cardiac shock. I explanted the Impella. Critical care time spent 50 minutes in managing the patient talking to the family and explanting Impella. Thank you for allowing us to participate in the care of Jamison Quiles. Please do not hesitate to contact me if you have any questions.     Rojelio Smith MD, MPH    [place] [address]  Ph: [phone]  Fax: [phone]    6/21/2019 9:30 AM

## 2019-06-21 NOTE — CONSULTS
deformity. Psychiatric: Intubated; no cough or gag reflex      Data Reviewed:   LABS:  CBC:   Recent Labs     19  2342 19  0929 19  0930   WBC 7.4 15.2*  --  14.3*   HGB 15.4 15.5 14.9 14.2   HCT 48.9 46.3  --  42.5   .3* 92.4  --  93.0    334  --  279     BMP:   Recent Labs     19  0135    144   K 3.1* 2.9*   CL 94* 106   CO2 8* 17*   PHOS  --  3.0   BUN 14 21*   CREATININE 1.5* 1.5*     LIVER PROFILE:   Recent Labs     19   *   *   BILITOT 0.5   ALKPHOS 68     PT/INR:   Recent Labs     19  0142   PROTIME 12.0 13.7*   INR 1.05 1.20*     APTT:   Recent Labs     19  0142   APTT 40.1* 63.2*     AB/21- 7.37/32/233 (on 100%)      Images and reports from chest imaging were reviewed by me. My interpretation is:  CXR (19): Diffuse bilateral infiltrates; ETT in place    Assessment:     Cardiac arrest  STEMI  Ventricular fibrillation   Anoxic encephalopathy  Acute hypoxic respiratory failure  Acute pulmonary edema  Metabolic acidosis  Lactic acidosis  Acute kidney injury  Transaminitis    Plan:      Cardiac arrest  - Due to STEMI  - s/p PCI to the LAD    STEMI  - s/p PCI to the LAD  - On ASA, brilinta, metoprolol and lipitor    Ventricular fibrillation  - On amiodarone drip    Anoxic encephalopathy  - Supportive care. Will re-assess neurologic status    Acute hypoxic respiratory failure  - Continue mechanical ventilation pending further assessment of neurologic status    Acute pulmonary edema  - Lasix 40mg IV daily  - Strict I/Os    Metabolic acidosis  - Due to renal failure and lactic acidosis  - Give 2 amps sodium bicarbonate     Lactic acidosis  - Lasix to increase forward flow  - Cycle every 6 hours    Acute kidney injury  - Due to hypoperfusion  - Lasix to improve forward flow  - Check labs in a.m.     Transaminitis  - Likely due to hypoperfusion  - Check labs in

## 2019-06-21 NOTE — SIGNIFICANT EVENT
[facility] called to update that family wishes to withdraw care, reached answering service, Coordinator to reach out to us. Pt remains intubated. , all lines and gtts still running at the moment,

## 2019-06-21 NOTE — CONSULTS
Neurology Consult Note  Reason for Consult: assessment for anoxic brain injury    Chief complaint: unable to obtain from the patient at this time    Dr Ping Esquivel MD asked me to see Blake Jacobsen in consultation for evaluation of assessment for anoxic maynor injury    History of Present Illness: Blake Jacobsen is a 51 y.o. male who presents with cardiac arrest.      I obtained my information via interview with the patient's wife and family at the bedside, supplemented by chart review. The patient's wife says that last evening he had been working out in the other room. She says the first time she checked on him he appeared to be doing OK. When she went to check on him a second time, he apparently was still awake, though did not seem to be himself and was leaning off to one side. She tells me that she proceeded to call 911 and by the time they arrived to the scene he was unresponsive, though unclear exactly how long. No CPR had been started. He was found to be completely unresponsive by EMS personnel, not breathing, in ventricular fibrillation. He was treated per ACLS protocol with ROSC achieved after several rounds (5) of medication. When he arrived here, his BP was adequate. No fever. He was still unresponsive. The decision was made to intubate. CT head was without any acute findings. EKG identified a STEMI. He was subsequently taken to the cath lab per Cardiology and underwent PCI for an occluded LAD artery. An Impella device was also implanted. Hypothermia protocol was initiated around 0100. Per RN noted, the hospitalist had spoken with the family a short time after 0400 this morning and apparently the family had expressed that they wished to withdraw care. I believe around 0500, hypothermia protocol was stopped, the Impella device was turned off and subsequently removed, the ventilator was reportedly turned off and medications discontinued.         Apparently Cardiology and given encephalopathy  orientation unable to assess given encephalopathy  General fund of knowledge unable to assess given encephalopathy   Memory unable to assess given encephalopathy  Attention poor  Language unable to assess given encephalopathy  Comprehension not following any commands  CN2: no blink to threat or corneal reflexes bilaterally. CN 3,4,6: no forced gaze deviation. Pupils are minimally, if at all, responsive. CN5: facial sensation limited given encephalopathy. CN7: face symmetric but exam limited by ET tube  CN8: hearing exam limited given encephalopathy  CN9: unclear if there is a gag reflex present. CN11: limited exam given encephalopathy  CN12: limited exam given encephalopathy  Strength: alcides at this time. Deep tendon reflexes: normal in all 4 extremities  Sensory: no response to painful stimulus. Cerebellar/coordination: alcides given encephalopathy  Tone: no increased tone or rigidity. Gait: alcides given encephalopathy and intubated with ventilator. Labs  Glucose 145  Na 144  K 2.9  Mg 3.40  BUN 21  Creatinine 1.5  Lactic acid 5.8  Calcium, Ion 0.99    WBC 15.2K  Hg 15.5  Platelets 571        Studies  CT head 6/20/19, independently reviewed  No acute intracranial abnormality. Impression  1. Anoxic encephalopathy s/p cardiac arrest.  He is intubated, currently on no sedation. He is completely unresponsive with frequent myoclonic activity which lends itself to a poor prognosis in this situation. 2.  STEMI s/p PCI of LAD artery  3. Hyperlipidemia  4. Lactic acidosis    Recommendations  1. May consider adding Depacon for myoclonus. 2.  MRI brain w/o contrast may be useful. 3.  Serial neurologic examinations to monitor for any meaningful neurologic improvement. Prognosis appears grim.       Richie Stokes NP  [address] [place] [address] [phone] Neurology    A copy of this note was provided for Dr Lesa Miles MD

## 2019-06-22 ENCOUNTER — APPOINTMENT (OUTPATIENT)
Dept: GENERAL RADIOLOGY | Age: 49
DRG: 003 | End: 2019-06-22
Payer: COMMERCIAL

## 2019-06-22 LAB
ALBUMIN SERPL-MCNC: 3.5 G/DL (ref 3.4–5)
ALP BLD-CCNC: 49 U/L (ref 40–129)
ALT SERPL-CCNC: 125 U/L (ref 10–40)
ANION GAP SERPL CALCULATED.3IONS-SCNC: 17 MMOL/L (ref 3–16)
ANION GAP SERPL CALCULATED.3IONS-SCNC: 20 MMOL/L (ref 3–16)
AST SERPL-CCNC: 423 U/L (ref 15–37)
BASE EXCESS ARTERIAL: 0.3 MMOL/L (ref -3–3)
BASOPHILS ABSOLUTE: 0 K/UL (ref 0–0.2)
BASOPHILS RELATIVE PERCENT: 0.1 %
BILIRUB SERPL-MCNC: 0.8 MG/DL (ref 0–1)
BILIRUBIN DIRECT: <0.2 MG/DL (ref 0–0.3)
BILIRUBIN, INDIRECT: ABNORMAL MG/DL (ref 0–1)
BUN BLDV-MCNC: 33 MG/DL (ref 7–20)
BUN BLDV-MCNC: 37 MG/DL (ref 7–20)
CALCIUM IONIZED: 1.04 MMOL/L (ref 1.12–1.32)
CALCIUM IONIZED: 1.08 MMOL/L (ref 1.12–1.32)
CALCIUM SERPL-MCNC: 8.7 MG/DL (ref 8.3–10.6)
CALCIUM SERPL-MCNC: 9 MG/DL (ref 8.3–10.6)
CARBOXYHEMOGLOBIN ARTERIAL: 0.5 % (ref 0–1.5)
CHLORIDE BLD-SCNC: 102 MMOL/L (ref 99–110)
CHLORIDE BLD-SCNC: 107 MMOL/L (ref 99–110)
CO2: 20 MMOL/L (ref 21–32)
CO2: 22 MMOL/L (ref 21–32)
CREAT SERPL-MCNC: 2.2 MG/DL (ref 0.9–1.3)
CREAT SERPL-MCNC: 2.3 MG/DL (ref 0.9–1.3)
EOSINOPHILS ABSOLUTE: 0 K/UL (ref 0–0.6)
EOSINOPHILS RELATIVE PERCENT: 0 %
GFR AFRICAN AMERICAN: 37
GFR AFRICAN AMERICAN: 39
GFR NON-AFRICAN AMERICAN: 30
GFR NON-AFRICAN AMERICAN: 32
GLUCOSE BLD-MCNC: 110 MG/DL (ref 70–99)
GLUCOSE BLD-MCNC: 131 MG/DL (ref 70–99)
HCO3 ARTERIAL: 23.1 MMOL/L (ref 21–29)
HCT VFR BLD CALC: 36.2 % (ref 40.5–52.5)
HEMOGLOBIN PLASMA: 240 MG/DL (ref 0–9.7)
HEMOGLOBIN, ART, EXTENDED: 12.8 G/DL (ref 13.5–17.5)
HEMOGLOBIN: 12.4 G/DL (ref 13.5–17.5)
LACTIC ACID: 3 MMOL/L (ref 0.4–2)
LACTIC ACID: 3.6 MMOL/L (ref 0.4–2)
LACTIC ACID: 5.7 MMOL/L (ref 0.4–2)
LACTIC ACID: 6.7 MMOL/L (ref 0.4–2)
LYMPHOCYTES ABSOLUTE: 1.1 K/UL (ref 1–5.1)
LYMPHOCYTES RELATIVE PERCENT: 7.9 %
MAGNESIUM: 2.3 MG/DL (ref 1.8–2.4)
MAGNESIUM: 2.6 MG/DL (ref 1.8–2.4)
MCH RBC QN AUTO: 30.7 PG (ref 26–34)
MCHC RBC AUTO-ENTMCNC: 34.1 G/DL (ref 31–36)
MCV RBC AUTO: 89.9 FL (ref 80–100)
METHEMOGLOBIN ARTERIAL: 1.3 %
MONOCYTES ABSOLUTE: 1.1 K/UL (ref 0–1.3)
MONOCYTES RELATIVE PERCENT: 8.3 %
NEUTROPHILS ABSOLUTE: 11.5 K/UL (ref 1.7–7.7)
NEUTROPHILS RELATIVE PERCENT: 83.7 %
O2 CONTENT ARTERIAL: 18 ML/DL
O2 SAT, ARTERIAL: 98.8 %
O2 THERAPY: ABNORMAL
PCO2 ARTERIAL: 33.2 MMHG (ref 35–45)
PDW BLD-RTO: 13.2 % (ref 12.4–15.4)
PH ARTERIAL: 7.46 (ref 7.35–7.45)
PH VENOUS: 7.44 (ref 7.35–7.45)
PH VENOUS: 7.47 (ref 7.35–7.45)
PHOSPHORUS: 4.6 MG/DL (ref 2.5–4.9)
PHOSPHORUS: 4.7 MG/DL (ref 2.5–4.9)
PLATELET # BLD: 219 K/UL (ref 135–450)
PMV BLD AUTO: 8.9 FL (ref 5–10.5)
PO2 ARTERIAL: 145 MMHG (ref 75–108)
POTASSIUM SERPL-SCNC: 4.3 MMOL/L (ref 3.5–5.1)
POTASSIUM SERPL-SCNC: 4.6 MMOL/L (ref 3.5–5.1)
RBC # BLD: 4.03 M/UL (ref 4.2–5.9)
SODIUM BLD-SCNC: 141 MMOL/L (ref 136–145)
SODIUM BLD-SCNC: 147 MMOL/L (ref 136–145)
TCO2 ARTERIAL: 24.1 MMOL/L
TOTAL PROTEIN: 6.4 G/DL (ref 6.4–8.2)
WBC # BLD: 13.7 K/UL (ref 4–11)

## 2019-06-22 PROCEDURE — 85025 COMPLETE CBC W/AUTO DIFF WBC: CPT

## 2019-06-22 PROCEDURE — 99291 CRITICAL CARE FIRST HOUR: CPT | Performed by: INTERNAL MEDICINE

## 2019-06-22 PROCEDURE — 2580000003 HC RX 258: Performed by: PSYCHIATRY & NEUROLOGY

## 2019-06-22 PROCEDURE — 94003 VENT MGMT INPAT SUBQ DAY: CPT

## 2019-06-22 PROCEDURE — 71045 X-RAY EXAM CHEST 1 VIEW: CPT

## 2019-06-22 PROCEDURE — 94760 N-INVAS EAR/PLS OXIMETRY 1: CPT

## 2019-06-22 PROCEDURE — 82803 BLOOD GASES ANY COMBINATION: CPT

## 2019-06-22 PROCEDURE — 80048 BASIC METABOLIC PNL TOTAL CA: CPT

## 2019-06-22 PROCEDURE — 2100000000 HC CCU R&B

## 2019-06-22 PROCEDURE — 6360000002 HC RX W HCPCS: Performed by: INTERNAL MEDICINE

## 2019-06-22 PROCEDURE — 82330 ASSAY OF CALCIUM: CPT

## 2019-06-22 PROCEDURE — 83735 ASSAY OF MAGNESIUM: CPT

## 2019-06-22 PROCEDURE — 2580000003 HC RX 258: Performed by: INTERNAL MEDICINE

## 2019-06-22 PROCEDURE — 80076 HEPATIC FUNCTION PANEL: CPT

## 2019-06-22 PROCEDURE — 6370000000 HC RX 637 (ALT 250 FOR IP): Performed by: INTERNAL MEDICINE

## 2019-06-22 PROCEDURE — 84100 ASSAY OF PHOSPHORUS: CPT

## 2019-06-22 PROCEDURE — 2700000000 HC OXYGEN THERAPY PER DAY

## 2019-06-22 PROCEDURE — 94750 HC PULMONARY COMPLIANCE STUDY: CPT

## 2019-06-22 PROCEDURE — 83605 ASSAY OF LACTIC ACID: CPT

## 2019-06-22 PROCEDURE — 2500000003 HC RX 250 WO HCPCS: Performed by: PSYCHIATRY & NEUROLOGY

## 2019-06-22 RX ADMIN — VALPROATE SODIUM 500 MG: 100 INJECTION, SOLUTION INTRAVENOUS at 11:59

## 2019-06-22 RX ADMIN — ATORVASTATIN CALCIUM 40 MG: 40 TABLET, FILM COATED ORAL at 20:57

## 2019-06-22 RX ADMIN — Medication 10 ML: at 20:58

## 2019-06-22 RX ADMIN — VALPROATE SODIUM 500 MG: 100 INJECTION, SOLUTION INTRAVENOUS at 19:49

## 2019-06-22 RX ADMIN — FAMOTIDINE 20 MG: 20 TABLET ORAL at 08:44

## 2019-06-22 RX ADMIN — ASPIRIN 81 MG 81 MG: 81 TABLET ORAL at 08:44

## 2019-06-22 RX ADMIN — TICAGRELOR 90 MG: 90 TABLET ORAL at 08:44

## 2019-06-22 RX ADMIN — FUROSEMIDE 40 MG: 10 INJECTION, SOLUTION INTRAMUSCULAR; INTRAVENOUS at 08:44

## 2019-06-22 RX ADMIN — CHLORHEXIDINE GLUCONATE 0.12% ORAL RINSE 15 ML: 1.2 LIQUID ORAL at 08:44

## 2019-06-22 RX ADMIN — VALPROATE SODIUM 500 MG: 100 INJECTION, SOLUTION INTRAVENOUS at 04:08

## 2019-06-22 RX ADMIN — CHLORHEXIDINE GLUCONATE 0.12% ORAL RINSE 15 ML: 1.2 LIQUID ORAL at 20:57

## 2019-06-22 RX ADMIN — METOPROLOL SUCCINATE 25 MG: 25 TABLET, EXTENDED RELEASE ORAL at 08:44

## 2019-06-22 RX ADMIN — TICAGRELOR 90 MG: 90 TABLET ORAL at 20:58

## 2019-06-22 RX ADMIN — FAMOTIDINE 20 MG: 20 TABLET ORAL at 20:58

## 2019-06-22 ASSESSMENT — PULMONARY FUNCTION TESTS
PIF_VALUE: 8
PIF_VALUE: 8
PIF_VALUE: 9
PIF_VALUE: 13
PIF_VALUE: 13
PIF_VALUE: 9
PIF_VALUE: 13
PIF_VALUE: 10
PIF_VALUE: 11
PIF_VALUE: 13
PIF_VALUE: 11
PIF_VALUE: 13
PIF_VALUE: 12
PIF_VALUE: 13
PIF_VALUE: 12
PIF_VALUE: 18
PIF_VALUE: 8
PIF_VALUE: 12
PIF_VALUE: 14
PIF_VALUE: 12
PIF_VALUE: 12
PIF_VALUE: 18
PIF_VALUE: 8
PIF_VALUE: 11
PIF_VALUE: 13
PIF_VALUE: 8
PIF_VALUE: 13

## 2019-06-22 NOTE — PROGRESS NOTES
glucose, dextrose, glucagon (rDNA), dextrose, sodium chloride flush, acetaminophen, magnesium hydroxide    Labs reviewed:  CBC:   Recent Labs     06/20/19  2342 06/21/19  0929 06/21/19  0930 06/22/19  0655   WBC 15.2*  --  14.3* 13.7*   HGB 15.5 14.9 14.2 12.4*   HCT 46.3  --  42.5 36.2*   MCV 92.4  --  93.0 89.9     --  279 219     BMP:   Recent Labs     06/21/19  1720 06/21/19  2340 06/22/19  0655    147* 141   K 4.5 4.6 4.3    107 102   CO2 16* 20* 22   PHOS 4.6 4.7 4.6   BUN 25* 33* 37*   CREATININE 1.6* 2.3* 2.2*     LIVER PROFILE:   Recent Labs     06/20/19  1925 06/22/19  0655   * 423*   * 125*   BILIDIR  --  <0.2   BILITOT 0.5 0.8   ALKPHOS 68 49     PT/INR:   Recent Labs     06/20/19 1925 06/21/19  0142   PROTIME 12.0 13.7*   INR 1.05 1.20*     APTT:   Recent Labs     06/20/19 1925 06/21/19  0142   APTT 40.1* 63.2*     UA:No results for input(s): NITRITE, COLORU, PHUR, LABCAST, WBCUA, RBCUA, MUCUS, TRICHOMONAS, YEAST, BACTERIA, CLARITYU, SPECGRAV, LEUKOCYTESUR, UROBILINOGEN, BILIRUBINUR, BLOODU, GLUCOSEU, AMORPHOUS in the last 72 hours. Invalid input(s): [facility]  No results for input(s): PH, PCO2, PO2 in the last 72 hours. Films:  Radiology Review:  Pertinent images / reports were reviewed as a part of this visit. CT Chest w/ contrast: No results found for this or any previous visit. CT Chest w/o contrast: No results found for this or any previous visit. CTPA: No results found for this or any previous visit. CXR PA/LAT: No results found for this or any previous visit.     CXR portable:   Results for orders placed during the hospital encounter of 06/20/19   XR CHEST PORTABLE    Narrative EXAMINATION:  ONE XRAY VIEW OF THE CHEST    6/22/2019 3:38 am    COMPARISON:  06/20/2019    HISTORY:  ORDERING SYSTEM PROVIDED HISTORY: respiratory failure  TECHNOLOGIST PROVIDED HISTORY:  Reason for exam:->respiratory failure  Ordering Physician Provided Reason for 6/20/2019 11:30 PM   Number of days: 1             Assessment:     Cardiac arrest  coma  STEMI  Ventricular fibrillation  Acute anoxic encephalopathy  Acute hypoxemic respiratory failure, with SPO2 less than 90% on room air  Acute pulmonary edema  Lactic acidosis  Acute kidney injury      Plan:      -While oxygenation is improving, and chest x-ray looks more clear, biggest concern remains his very poor mental status which is shown no sign of improvement since cardiac arrest.  -Wean FiO2 to 40% today  -He is overbreathing the vent but does not respond to even painful stimuli. Good urine output, continue Lasix  -ABG compensated  -poor prognosis in regards to neurologic recovery.  -Peridex  -DVT prophylaxis with SCDs  GI prophylaxis pepcid    Due to the immediate potential for life-threatening deterioration due to arrest, comatose, respiratory failure, I spent 45 minutes providing critical care. This time is excluding time spent performing procedures.       Thank you for this consult,    Janna Godoy [address] [facility], [facility] Sleep Medicine

## 2019-06-23 ENCOUNTER — APPOINTMENT (OUTPATIENT)
Dept: CT IMAGING | Age: 49
DRG: 003 | End: 2019-06-23
Payer: COMMERCIAL

## 2019-06-23 ENCOUNTER — APPOINTMENT (OUTPATIENT)
Dept: GENERAL RADIOLOGY | Age: 49
DRG: 003 | End: 2019-06-23
Payer: COMMERCIAL

## 2019-06-23 LAB
ANION GAP SERPL CALCULATED.3IONS-SCNC: 16 MMOL/L (ref 3–16)
BASE EXCESS ARTERIAL: 5.3 MMOL/L (ref -3–3)
BASOPHILS ABSOLUTE: 0 K/UL (ref 0–0.2)
BASOPHILS RELATIVE PERCENT: 0.3 %
BUN BLDV-MCNC: 41 MG/DL (ref 7–20)
CALCIUM SERPL-MCNC: 8.3 MG/DL (ref 8.3–10.6)
CARBOXYHEMOGLOBIN ARTERIAL: 0.8 % (ref 0–1.5)
CHLORIDE BLD-SCNC: 105 MMOL/L (ref 99–110)
CO2: 26 MMOL/L (ref 21–32)
CREAT SERPL-MCNC: 2 MG/DL (ref 0.9–1.3)
EOSINOPHILS ABSOLUTE: 0 K/UL (ref 0–0.6)
EOSINOPHILS RELATIVE PERCENT: 0 %
GFR AFRICAN AMERICAN: 43
GFR NON-AFRICAN AMERICAN: 36
GLUCOSE BLD-MCNC: 119 MG/DL (ref 70–99)
HCO3 ARTERIAL: 28.5 MMOL/L (ref 21–29)
HCT VFR BLD CALC: 33.6 % (ref 40.5–52.5)
HEMOGLOBIN, ART, EXTENDED: 11.5 G/DL (ref 13.5–17.5)
HEMOGLOBIN: 11.4 G/DL (ref 13.5–17.5)
LACTIC ACID: 1.8 MMOL/L (ref 0.4–2)
LACTIC ACID: 2.1 MMOL/L (ref 0.4–2)
LYMPHOCYTES ABSOLUTE: 0.6 K/UL (ref 1–5.1)
LYMPHOCYTES RELATIVE PERCENT: 4.6 %
MCH RBC QN AUTO: 30.8 PG (ref 26–34)
MCHC RBC AUTO-ENTMCNC: 34 G/DL (ref 31–36)
MCV RBC AUTO: 90.8 FL (ref 80–100)
METHEMOGLOBIN ARTERIAL: 1.2 %
MONOCYTES ABSOLUTE: 1 K/UL (ref 0–1.3)
MONOCYTES RELATIVE PERCENT: 8.1 %
NEUTROPHILS ABSOLUTE: 10.8 K/UL (ref 1.7–7.7)
NEUTROPHILS RELATIVE PERCENT: 87 %
O2 CONTENT ARTERIAL: 16 ML/DL
O2 SAT, ARTERIAL: 98.9 %
O2 THERAPY: ABNORMAL
PCO2 ARTERIAL: 37.4 MMHG (ref 35–45)
PDW BLD-RTO: 13.4 % (ref 12.4–15.4)
PH ARTERIAL: 7.49 (ref 7.35–7.45)
PLATELET # BLD: 212 K/UL (ref 135–450)
PMV BLD AUTO: 9 FL (ref 5–10.5)
PO2 ARTERIAL: 134 MMHG (ref 75–108)
POTASSIUM SERPL-SCNC: 4.2 MMOL/L (ref 3.5–5.1)
RBC # BLD: 3.69 M/UL (ref 4.2–5.9)
SODIUM BLD-SCNC: 147 MMOL/L (ref 136–145)
TCO2 ARTERIAL: 29.6 MMOL/L
WBC # BLD: 12.4 K/UL (ref 4–11)

## 2019-06-23 PROCEDURE — 6370000000 HC RX 637 (ALT 250 FOR IP): Performed by: INTERNAL MEDICINE

## 2019-06-23 PROCEDURE — 99233 SBSQ HOSP IP/OBS HIGH 50: CPT | Performed by: NURSE PRACTITIONER

## 2019-06-23 PROCEDURE — 6360000002 HC RX W HCPCS: Performed by: NURSE PRACTITIONER

## 2019-06-23 PROCEDURE — 71045 X-RAY EXAM CHEST 1 VIEW: CPT

## 2019-06-23 PROCEDURE — 2100000000 HC CCU R&B

## 2019-06-23 PROCEDURE — 2580000003 HC RX 258: Performed by: PSYCHIATRY & NEUROLOGY

## 2019-06-23 PROCEDURE — 94750 HC PULMONARY COMPLIANCE STUDY: CPT

## 2019-06-23 PROCEDURE — 2500000003 HC RX 250 WO HCPCS: Performed by: PSYCHIATRY & NEUROLOGY

## 2019-06-23 PROCEDURE — 82803 BLOOD GASES ANY COMBINATION: CPT

## 2019-06-23 PROCEDURE — 83605 ASSAY OF LACTIC ACID: CPT

## 2019-06-23 PROCEDURE — 70450 CT HEAD/BRAIN W/O DYE: CPT

## 2019-06-23 PROCEDURE — 94761 N-INVAS EAR/PLS OXIMETRY MLT: CPT

## 2019-06-23 PROCEDURE — 85025 COMPLETE CBC W/AUTO DIFF WBC: CPT

## 2019-06-23 PROCEDURE — 6370000000 HC RX 637 (ALT 250 FOR IP): Performed by: NURSE PRACTITIONER

## 2019-06-23 PROCEDURE — 2580000003 HC RX 258: Performed by: INTERNAL MEDICINE

## 2019-06-23 PROCEDURE — 80048 BASIC METABOLIC PNL TOTAL CA: CPT

## 2019-06-23 PROCEDURE — 99291 CRITICAL CARE FIRST HOUR: CPT | Performed by: INTERNAL MEDICINE

## 2019-06-23 PROCEDURE — 2700000000 HC OXYGEN THERAPY PER DAY

## 2019-06-23 RX ORDER — FUROSEMIDE 10 MG/ML
20 INJECTION INTRAMUSCULAR; INTRAVENOUS DAILY
Status: DISCONTINUED | OUTPATIENT
Start: 2019-06-23 | End: 2019-06-24

## 2019-06-23 RX ADMIN — METOPROLOL TARTRATE 25 MG: 25 TABLET ORAL at 21:14

## 2019-06-23 RX ADMIN — CHLORHEXIDINE GLUCONATE 0.12% ORAL RINSE 15 ML: 1.2 LIQUID ORAL at 21:14

## 2019-06-23 RX ADMIN — FAMOTIDINE 20 MG: 20 TABLET ORAL at 21:14

## 2019-06-23 RX ADMIN — TICAGRELOR 90 MG: 90 TABLET ORAL at 21:14

## 2019-06-23 RX ADMIN — FUROSEMIDE 20 MG: 10 INJECTION, SOLUTION INTRAMUSCULAR; INTRAVENOUS at 10:15

## 2019-06-23 RX ADMIN — METOPROLOL SUCCINATE 25 MG: 25 TABLET, EXTENDED RELEASE ORAL at 08:14

## 2019-06-23 RX ADMIN — CHLORHEXIDINE GLUCONATE 0.12% ORAL RINSE 15 ML: 1.2 LIQUID ORAL at 08:14

## 2019-06-23 RX ADMIN — Medication 10 ML: at 21:24

## 2019-06-23 RX ADMIN — ASPIRIN 81 MG 81 MG: 81 TABLET ORAL at 08:13

## 2019-06-23 RX ADMIN — Medication 10 ML: at 10:16

## 2019-06-23 RX ADMIN — VALPROATE SODIUM 500 MG: 100 INJECTION, SOLUTION INTRAVENOUS at 19:43

## 2019-06-23 RX ADMIN — VALPROATE SODIUM 500 MG: 100 INJECTION, SOLUTION INTRAVENOUS at 12:20

## 2019-06-23 RX ADMIN — FAMOTIDINE 20 MG: 20 TABLET ORAL at 08:13

## 2019-06-23 RX ADMIN — TICAGRELOR 90 MG: 90 TABLET ORAL at 08:13

## 2019-06-23 RX ADMIN — VALPROATE SODIUM 500 MG: 100 INJECTION, SOLUTION INTRAVENOUS at 03:20

## 2019-06-23 ASSESSMENT — PULMONARY FUNCTION TESTS
PIF_VALUE: 13
PIF_VALUE: 16
PIF_VALUE: 0
PIF_VALUE: 12
PIF_VALUE: 12
PIF_VALUE: 13
PIF_VALUE: 12
PIF_VALUE: 13
PIF_VALUE: 12
PIF_VALUE: 13
PIF_VALUE: 18
PIF_VALUE: 16
PIF_VALUE: 12
PIF_VALUE: 13
PIF_VALUE: 15
PIF_VALUE: 12
PIF_VALUE: 14
PIF_VALUE: 13
PIF_VALUE: 13
PIF_VALUE: 12
PIF_VALUE: 12
PIF_VALUE: 13
PIF_VALUE: 12
PIF_VALUE: 13
PIF_VALUE: 17

## 2019-06-23 NOTE — PROGRESS NOTES
respiratory failure  Type of Exam: Subsequent/Follow-up    FINDINGS:  Endotracheal tube tip projects over the mid trachea. Enteric tube tip  projects over the body of the stomach. Defibrillator pads overlie chest.  Cardiomediastinal silhouette is unchanged. No pneumothorax or effusion. No  focal consolidation. No acute osseous abnormality. Impression Interval improvement in pulmonary edema. Access  Arterial  Arterial Line 06/21/19 Left Radial (Active)   $ Arterial line insertion $ Yes 6/21/2019 12:00 AM   Continued need for line? Yes 6/22/2019 10:00 AM   Site Assessment Clean;Dry; Intact 6/22/2019 10:00 AM   Line Status Arterial fluids per protocol; Intact and in place; Blood return noted 6/22/2019 10:00 AM   Art Line Waveform Appropriate;Square wave test performed 6/22/2019 10:00 AM   Art Line Interventions Zeroed and calibrated; Leveled; Connections checked and tightened;Flushed per protocol 6/22/2019  8:00 AM   Color/Movement/Sensation Capillary refill greater than 3 sec; Cool fingers/toes 6/22/2019 10:00 AM   Dressing Status Clean;Dry; Intact 6/22/2019 10:00 AM   Dressing Type Transparent; Anti-microbial patch 6/22/2019 10:00 AM   Dressing Intervention New 6/21/2019 12:00 AM   Dressing Change Due 06/26/19 6/22/2019 10:00 AM   Number of days: 1       PICC           CVC        CVC Triple Lumen 06/20/19 Left Femoral (Active)   $ Central line insertion $ Yes 6/20/2019 11:30 PM   Continued need for line? Yes 6/22/2019 10:00 AM   Site Assessment Dry; Intact 6/22/2019 10:00 AM   Proximal Lumen Status Infusing 6/22/2019 10:00 AM   Medial Lumen Status Infusing 6/22/2019 10:00 AM   Distal Lumen Status Transduced 6/22/2019 10:00 AM   Dressing Type Anti-microbial patch;Transparent 6/22/2019 10:00 AM   Dressing Status Intact; Old drainage 6/22/2019 10:00 AM   Dressing Intervention New 6/20/2019 11:30 PM   Dressing Change Due 06/26/19 6/22/2019 10:00 AM   Line Care Cap changed; Connections checked and tightened 6/20/2019 11:30 PM   Number of days: 1             Assessment:     Cardiac arrest  coma  STEMI  Ventricular fibrillation  Acute anoxic encephalopathy  Acute hypoxemic respiratory failure, with SPO2 less than 90% on room air  Acute pulmonary edema  Lactic acidosis  Acute kidney injury      Plan:      -Pulmonary status much improved, wean FiO2 to 35%  -Needs to have an extremely poor mental status, suspect this will not have any significant improvement, this is corroborated by evidence of anoxia on CT scan of the head  Good urine output, can stop Lasix  -Peridex  -DVT prophylaxis with SCDs  GI prophylaxis pepcid    Due to the immediate potential for life-threatening deterioration due to cardiac arrest, coma, respiratory failure, and anoxic brain injury I spent 35 minutes providing critical care. This time is excluding time spent performing procedures.       Thank you for this consult,    Janna Godoy [address] [facility], Critical [facility], [facility] Sleep Medicine

## 2019-06-23 NOTE — PROGRESS NOTES
Cardiology - PROGRESS NOTE    Admit Date: 6/20/2019     Reason for follow up: CAD/CHF           Social History:   reports that he has never smoked. He has never used smokeless tobacco. He reports that he does not drink alcohol or use drugs. Family History: family history is not on file. Interval History:   Patient seen and examined and notes reviewed   No acute events  Remains intubated   No sedation   HR improved   Nursing reports minimal flexion on BLE overnight     Unable to reproduce this AM     All other systems reviewed and negative except as above.         Diet: Diet NPO Effective Now      In: 515.8 [I.V.:215.8]  Out: 1360    Wt Readings from Last 3 Encounters:   06/23/19 173 lb 15.1 oz (78.9 kg)   02/18/16 180 lb 1.6 oz (81.7 kg)   12/09/15 178 lb 12.8 oz (81.1 kg)           Data:   Scheduled Meds:   Scheduled Meds:   chlorhexidine  15 mL Mouth/Throat BID    aspirin  81 mg Oral Daily    ticagrelor  90 mg Oral BID    valproate sodium (DEPACON) IVPB  500 mg Intravenous Q8H    furosemide  40 mg Intravenous Daily    sodium chloride flush  10 mL Intravenous 2 times per day    famotidine  20 mg Oral BID    atorvastatin  40 mg Oral Nightly    metoprolol succinate  25 mg Oral Daily    tirofiban  25 mcg/kg Intravenous Once     Continuous Infusions:   dextrose       PRN Meds:.ondansetron, glucose, dextrose, glucagon (rDNA), dextrose, sodium chloride flush, acetaminophen, magnesium hydroxide  Continuous Infusions:   dextrose         Intake/Output Summary (Last 24 hours) at 6/23/2019 0840  Last data filed at 6/23/2019 0800  Gross per 24 hour   Intake 515.77 ml   Output 1810 ml   Net -1294.23 ml       CBC:   Recent Labs     06/23/19  0500   WBC 12.4*   HGB 11.4*        BMP:  Recent Labs     06/23/19  0500   *   K 4.2      CO2 26   BUN 41*   CREATININE 2.0*   GLUCOSE 119*     ABGs:   Lab Results   Component Value Date    PHART symmetrical, trachea midline and thyroid not enlarged, symmetric, no tenderness/mass/nodules  Lungs: clear to auscultation bilaterally, intubated   Heart: regular rate and rhythm, S1, S2 normal, no murmur, click, rub or gallop  Abdomen: soft, non-tender; bowel sounds normal; no masses,  no organomegaly  Extremities: extremities normal, atraumatic, no cyanosis or edema, pulses: DP +2/+2, PT +2/+2  Neurologic: pupils 5-6 minimal reactive/sluggish, breathing over vent, unable to reproduce flexion        Assessment & Plan:    Patient Active Problem List:     Mixed hyperlipidemia     Leukopenia     Cardiac arrest ([facility] [address]     ST elevation myocardial infarction (STEMI) ([facility] [address]     Pulmonary edema cardiac cause ([facility] [address]     Cardiogenic shock ([facility] [address]     Ventricular fibrillation (HCC)     Anoxic encephalopathy (HCC)     Acute respiratory failure with hypoxia (HCC)     Acute pulmonary edema (HCC)     Metabolic acidosis     Lactic acidosis     YURI (acute kidney injury) ([facility] [address]     Transaminitis     STEMI involving left anterior descending coronary artery (HCC)     Acute systolic heart failure (HCC)     Ischemic cardiomyopathy        Plan:  1. CAD/STEMI    S/p anterior STEMI with IDALMIS to LAD    Continue medical management with ASA, Brilinta,and beta-blocker therapy   Will stop statin therapy secondary to elevated liver enzymes   2. Acute hypoxic respiratory failure   Vent management per critical care  3. Heart failure   Acute systolic heart failure   EF 30%   LVEDP elevated however does not appear fluid overloaded    Will decrease lasix today    Change beta-blocker to short acting for administration via OG    No ACE/ARB/aldactone secondary to renal fx   4. Anoxic encephalopathy   Neurology following    antiepileptics per neurology    Prognosis remains poor     Would consider Palliative Care Consult   5. YURI   Secondary to ATN and cardiogenic shock    Improving   6.  Lactic acidosis   Improving     Overall, the problems requiring hospitalization are high in severity.        Kevin Ch, ALEJANDRA-CNP   [facility]  Cardiology   6/23/2019  8:40 AM

## 2019-06-23 NOTE — PROGRESS NOTES
Progress Note    Updates  No significant events overnight. No clinical change.         Past Medical History:   Diagnosis Date    YURI (acute kidney injury) ([facility] [address]     Mixed hyperlipidemia 2/18/2016     Current Facility-Administered Medications:     metoprolol tartrate (LOPRESSOR) tablet 25 mg, 25 mg, Oral, BID, ALEJADNRA Leblanc CNP    furosemide (LASIX) injection 20 mg, 20 mg, Intravenous, Daily, ALEJANDRA Leblanc CNP    ondansetron (ZOFRAN) injection 4 mg, 4 mg, Intravenous, Q6H PRN, Sancho Gerber MD    chlorhexidine (PERIDEX) 0.12 % solution 15 mL, 15 mL, Mouth/Throat, BID, Sancho Gerber MD, 15 mL at 06/23/19 0814    glucose (GLUTOSE) 40 % oral gel 15 g, 15 g, Oral, PRN, Sancho Gerber MD    dextrose 50 % IV solution, 12.5 g, Intravenous, PRN, Sancho Gerber MD    glucagon (rDNA) injection 1 mg, 1 mg, Intramuscular, PRN, Sancho Gerber MD    dextrose 5 % solution, 100 mL/hr, Intravenous, PRN, Sancho Gerber MD    aspirin chewable tablet 81 mg, 81 mg, Oral, Daily, Houston Bowman MD, 81 mg at 06/23/19 0813    ticagrelor (BRILINTA) tablet 90 mg, 90 mg, Oral, BID, Houston Bowman MD, 90 mg at 06/23/19 0813    valproate (DEPACON) 500 mg in dextrose 5 % 100 mL IVPB, 500 mg, Intravenous, Q8H, Connie Akhtar MD, Stopped at 06/23/19 0420    sodium chloride flush 0.9 % injection 10 mL, 10 mL, Intravenous, 2 times per day, Sancho Gerber MD, 10 mL at 06/22/19 2058    sodium chloride flush 0.9 % injection 10 mL, 10 mL, Intravenous, PRN, Sancho Gerber MD    acetaminophen (TYLENOL) tablet 650 mg, 650 mg, Oral, Q4H PRN, Sancho Gerber MD    magnesium hydroxide (MILK OF MAGNESIA) 400 MG/5ML suspension 30 mL, 30 mL, Oral, Daily PRN, Sancho Gerber MD    famotidine (PEPCID) tablet 20 mg, 20 mg, Oral, BID, Sancho Gerber MD, 20 mg at 06/23/19 0813    tirofiban (AGGRASTAT) IV bolus 2,130 mcg, 25 mcg/kg, Intravenous, Once, Sancho Gerber MD, Stopped at 06/21/19 0159    Exam  Blood pressure 118/81, pulse 101, temperature 98.9 °F (37.2 °C), temperature source Oral, resp. rate 16, height 6' 2\" (1.88 m), weight 173 lb 15.1 oz (78.9 kg), SpO2 100 %. Constitutional                          Vital signs: BP, HR, and RR reviewed            General depressed mental status, no distress  Eyes: unable to visualize the fundi.    Cardiovascular: pulses symmetric in all 4 extremities. Psychiatric: limited exam given encephalopathy  Neurologic  Mental status: limited exam given encephalopathy  orientation unable to assess given encephalopathy  Attention poor  Language unable to assess given encephalopathy  Comprehension not following any commands  CN2: no blink to threat or corneal reflexes bilaterally.    CN 3,4,6: no forced gaze deviation. A bit of dysconjugate gaze.  Pupils are equal and round. Left pupil reacted initially. CN7: face symmetric but exam limited by ET tube  CN8: hearing exam limited given encephalopathy  CN9: no gag. CN11: limited exam given encephalopathy  CN12: limited exam given encephalopathy  Strength: alcides at this time.    Sensory: no response to painful stimulus.    Cerebellar/coordination: alcides given encephalopathy  Tone: no increased tone or rigidity.    Gait: alcides given encephalopathy and intubated with ventilator. ROS - unable to obtain at this time.  Chart reviewed.      Labs  Glucose 119  Na 147  K 4.2  BUN 41  Creatinine 2.0    WBC 12.4K  Hg 11.4  Platelets 505    Studies  CT head 6/23/19  Developing cerebral edema consistent with anoxic maynor injury.       ECHO 6/21/19  EF 30%    Impression  1. Anoxic encephalopathy s/p cardiac arrest.   2.  STEMI s/p PCI of LAD artery. Recommendations  1. Continue Depacon. 2.  Serial neurologic examinations.       Prognosis remains grim. Family may have to make a decision regarding care moving forward soon. Will follow from periphery. Please call with any additional questions or concerns.   Thank you.      Gabrielle Joya NP  [address] [place] [address] [phone] Neurology    A copy of this note was provided for Dr Siri Matias MD

## 2019-06-24 ENCOUNTER — APPOINTMENT (OUTPATIENT)
Dept: GENERAL RADIOLOGY | Age: 49
DRG: 003 | End: 2019-06-24
Payer: COMMERCIAL

## 2019-06-24 LAB
ALBUMIN SERPL-MCNC: 3.6 G/DL (ref 3.4–5)
ALP BLD-CCNC: 58 U/L (ref 40–129)
ALT SERPL-CCNC: 77 U/L (ref 10–40)
ANION GAP SERPL CALCULATED.3IONS-SCNC: 16 MMOL/L (ref 3–16)
AST SERPL-CCNC: 405 U/L (ref 15–37)
BACTERIA: ABNORMAL /HPF
BASE EXCESS ARTERIAL: 6.6 MMOL/L (ref -3–3)
BASOPHILS ABSOLUTE: 0 K/UL (ref 0–0.2)
BASOPHILS RELATIVE PERCENT: 0.2 %
BILIRUB SERPL-MCNC: 0.6 MG/DL (ref 0–1)
BILIRUBIN DIRECT: <0.2 MG/DL (ref 0–0.3)
BILIRUBIN URINE: NEGATIVE
BILIRUBIN, INDIRECT: ABNORMAL MG/DL (ref 0–1)
BLOOD, URINE: ABNORMAL
BUN BLDV-MCNC: 32 MG/DL (ref 7–20)
CALCIUM SERPL-MCNC: 8.5 MG/DL (ref 8.3–10.6)
CARBOXYHEMOGLOBIN ARTERIAL: 1 % (ref 0–1.5)
CASTS: ABNORMAL /LPF
CHLORIDE BLD-SCNC: 105 MMOL/L (ref 99–110)
CLARITY: CLEAR
CO2: 27 MMOL/L (ref 21–32)
COLOR: YELLOW
CREAT SERPL-MCNC: 1.5 MG/DL (ref 0.9–1.3)
EOSINOPHILS ABSOLUTE: 0 K/UL (ref 0–0.6)
EOSINOPHILS RELATIVE PERCENT: 0 %
EPITHELIAL CELLS, UA: 5 /HPF (ref 0–5)
GFR AFRICAN AMERICAN: >60
GFR NON-AFRICAN AMERICAN: 50
GLUCOSE BLD-MCNC: 112 MG/DL (ref 70–99)
GLUCOSE URINE: NEGATIVE MG/DL
HCO3 ARTERIAL: 29.9 MMOL/L (ref 21–29)
HCT VFR BLD CALC: 32.8 % (ref 40.5–52.5)
HEMOGLOBIN, ART, EXTENDED: 11.4 G/DL (ref 13.5–17.5)
HEMOGLOBIN: 11.3 G/DL (ref 13.5–17.5)
HYALINE CASTS: 9 /LPF (ref 0–8)
KETONES, URINE: 15 MG/DL
LEUKOCYTE ESTERASE, URINE: ABNORMAL
LYMPHOCYTES ABSOLUTE: 0.4 K/UL (ref 1–5.1)
LYMPHOCYTES RELATIVE PERCENT: 4.5 %
MCH RBC QN AUTO: 31.3 PG (ref 26–34)
MCHC RBC AUTO-ENTMCNC: 34.4 G/DL (ref 31–36)
MCV RBC AUTO: 91 FL (ref 80–100)
METHEMOGLOBIN ARTERIAL: 1.1 %
MICROSCOPIC EXAMINATION: YES
MONOCYTES ABSOLUTE: 1 K/UL (ref 0–1.3)
MONOCYTES RELATIVE PERCENT: 9.9 %
MUCUS: ABNORMAL /LPF
NEUTROPHILS ABSOLUTE: 8.3 K/UL (ref 1.7–7.7)
NEUTROPHILS RELATIVE PERCENT: 85.4 %
NITRITE, URINE: NEGATIVE
O2 CONTENT ARTERIAL: 15 ML/DL
O2 SAT, ARTERIAL: 97.9 %
O2 THERAPY: ABNORMAL
PCO2 ARTERIAL: 38.4 MMHG (ref 35–45)
PDW BLD-RTO: 13.1 % (ref 12.4–15.4)
PH ARTERIAL: 7.5 (ref 7.35–7.45)
PH UA: 5.5 (ref 5–8)
PLATELET # BLD: 185 K/UL (ref 135–450)
PMV BLD AUTO: 9 FL (ref 5–10.5)
PO2 ARTERIAL: 94.8 MMHG (ref 75–108)
POC ACT LR: 167 SEC
POC ACT LR: 171 SEC
POTASSIUM SERPL-SCNC: 3.8 MMOL/L (ref 3.5–5.1)
PROTEIN UA: 30 MG/DL
RBC # BLD: 3.6 M/UL (ref 4.2–5.9)
RBC UA: 7 /HPF (ref 0–4)
RENAL EPITHELIAL, UA: ABNORMAL /HPF
SODIUM BLD-SCNC: 148 MMOL/L (ref 136–145)
SPECIFIC GRAVITY UA: 1.02 (ref 1–1.03)
TCO2 ARTERIAL: 31.1 MMOL/L
TOTAL PROTEIN: 7 G/DL (ref 6.4–8.2)
URINE REFLEX TO CULTURE: YES
URINE TYPE: ABNORMAL
UROBILINOGEN, URINE: 1 E.U./DL
WBC # BLD: 9.7 K/UL (ref 4–11)
WBC UA: 13 /HPF (ref 0–5)

## 2019-06-24 PROCEDURE — 6360000002 HC RX W HCPCS: Performed by: NURSE PRACTITIONER

## 2019-06-24 PROCEDURE — 87205 SMEAR GRAM STAIN: CPT

## 2019-06-24 PROCEDURE — 87040 BLOOD CULTURE FOR BACTERIA: CPT

## 2019-06-24 PROCEDURE — 87077 CULTURE AEROBIC IDENTIFY: CPT

## 2019-06-24 PROCEDURE — 87086 URINE CULTURE/COLONY COUNT: CPT

## 2019-06-24 PROCEDURE — 99291 CRITICAL CARE FIRST HOUR: CPT | Performed by: INTERNAL MEDICINE

## 2019-06-24 PROCEDURE — 6370000000 HC RX 637 (ALT 250 FOR IP): Performed by: INTERNAL MEDICINE

## 2019-06-24 PROCEDURE — 2580000003 HC RX 258: Performed by: PSYCHIATRY & NEUROLOGY

## 2019-06-24 PROCEDURE — 80048 BASIC METABOLIC PNL TOTAL CA: CPT

## 2019-06-24 PROCEDURE — 2100000000 HC CCU R&B

## 2019-06-24 PROCEDURE — 2700000000 HC OXYGEN THERAPY PER DAY

## 2019-06-24 PROCEDURE — 99232 SBSQ HOSP IP/OBS MODERATE 35: CPT | Performed by: NURSE PRACTITIONER

## 2019-06-24 PROCEDURE — 82803 BLOOD GASES ANY COMBINATION: CPT

## 2019-06-24 PROCEDURE — 6370000000 HC RX 637 (ALT 250 FOR IP): Performed by: NURSE PRACTITIONER

## 2019-06-24 PROCEDURE — 80076 HEPATIC FUNCTION PANEL: CPT

## 2019-06-24 PROCEDURE — 2580000003 HC RX 258: Performed by: INTERNAL MEDICINE

## 2019-06-24 PROCEDURE — 87070 CULTURE OTHR SPECIMN AEROBIC: CPT

## 2019-06-24 PROCEDURE — 86403 PARTICLE AGGLUT ANTBDY SCRN: CPT

## 2019-06-24 PROCEDURE — 71045 X-RAY EXAM CHEST 1 VIEW: CPT

## 2019-06-24 PROCEDURE — 94761 N-INVAS EAR/PLS OXIMETRY MLT: CPT

## 2019-06-24 PROCEDURE — 85025 COMPLETE CBC W/AUTO DIFF WBC: CPT

## 2019-06-24 PROCEDURE — 94003 VENT MGMT INPAT SUBQ DAY: CPT

## 2019-06-24 PROCEDURE — 81001 URINALYSIS AUTO W/SCOPE: CPT

## 2019-06-24 PROCEDURE — 36415 COLL VENOUS BLD VENIPUNCTURE: CPT

## 2019-06-24 PROCEDURE — 2500000003 HC RX 250 WO HCPCS: Performed by: PSYCHIATRY & NEUROLOGY

## 2019-06-24 PROCEDURE — 87186 SC STD MICRODIL/AGAR DIL: CPT

## 2019-06-24 PROCEDURE — 94750 HC PULMONARY COMPLIANCE STUDY: CPT

## 2019-06-24 RX ORDER — METOPROLOL SUCCINATE 50 MG/1
50 TABLET, EXTENDED RELEASE ORAL DAILY
Status: DISCONTINUED | OUTPATIENT
Start: 2019-06-25 | End: 2019-06-25

## 2019-06-24 RX ADMIN — Medication 10 ML: at 08:00

## 2019-06-24 RX ADMIN — ACETAMINOPHEN 650 MG: 325 TABLET ORAL at 07:55

## 2019-06-24 RX ADMIN — FAMOTIDINE 20 MG: 20 TABLET ORAL at 08:00

## 2019-06-24 RX ADMIN — ACETAMINOPHEN 650 MG: 325 TABLET ORAL at 12:04

## 2019-06-24 RX ADMIN — METOPROLOL TARTRATE 25 MG: 25 TABLET ORAL at 08:00

## 2019-06-24 RX ADMIN — VALPROATE SODIUM 500 MG: 100 INJECTION, SOLUTION INTRAVENOUS at 11:57

## 2019-06-24 RX ADMIN — Medication 10 ML: at 20:30

## 2019-06-24 RX ADMIN — CHLORHEXIDINE GLUCONATE 0.12% ORAL RINSE 15 ML: 1.2 LIQUID ORAL at 20:52

## 2019-06-24 RX ADMIN — FAMOTIDINE 20 MG: 20 TABLET ORAL at 20:52

## 2019-06-24 RX ADMIN — METOPROLOL TARTRATE 25 MG: 25 TABLET ORAL at 20:52

## 2019-06-24 RX ADMIN — FUROSEMIDE 20 MG: 10 INJECTION, SOLUTION INTRAMUSCULAR; INTRAVENOUS at 07:56

## 2019-06-24 RX ADMIN — CHLORHEXIDINE GLUCONATE 0.12% ORAL RINSE 15 ML: 1.2 LIQUID ORAL at 08:04

## 2019-06-24 RX ADMIN — ASPIRIN 81 MG 81 MG: 81 TABLET ORAL at 07:54

## 2019-06-24 RX ADMIN — TICAGRELOR 90 MG: 90 TABLET ORAL at 08:00

## 2019-06-24 RX ADMIN — VALPROATE SODIUM 500 MG: 100 INJECTION, SOLUTION INTRAVENOUS at 20:51

## 2019-06-24 RX ADMIN — VALPROATE SODIUM 500 MG: 100 INJECTION, SOLUTION INTRAVENOUS at 04:14

## 2019-06-24 RX ADMIN — TICAGRELOR 90 MG: 90 TABLET ORAL at 20:52

## 2019-06-24 ASSESSMENT — PULMONARY FUNCTION TESTS
PIF_VALUE: 11
PIF_VALUE: 9
PIF_VALUE: 12
PIF_VALUE: 14
PIF_VALUE: 12
PIF_VALUE: 22
PIF_VALUE: 7
PIF_VALUE: 13
PIF_VALUE: 12
PIF_VALUE: 18
PIF_VALUE: 14
PIF_VALUE: 13
PIF_VALUE: 14
PIF_VALUE: 13
PIF_VALUE: 14
PIF_VALUE: 12
PIF_VALUE: 14
PIF_VALUE: 11

## 2019-06-24 ASSESSMENT — PAIN SCALES - GENERAL
PAINLEVEL_OUTOF10: 0
PAINLEVEL_OUTOF10: 0

## 2019-06-24 NOTE — FLOWSHEET NOTE
Pulmonology made aware of temperature. New orders given to remove a-line, central line, and obtain cultures.

## 2019-06-24 NOTE — CARE COORDINATION
LSW rec'd order regarding paperwork from wife. Call placed to wife, Salomón Segovia, to introduce  and inquire what paperwork she has. She reports she rec'd paperwork from the insurance company that suggests it be given to . She will be here today until 3pm.  LSW to see her today to review paperwork.   [place], [place]     Case Management   [phone]    6/24/2019  10:17 AM

## 2019-06-24 NOTE — CARE COORDINATION
TY met with wife to discuss her paperwork she wanted  to have. She reports pt's employer emailed her FMLA paperwork   She attempted to forward to [email or web address] but she did not know how to attach it. ROTIZW suggested she print it off at home and bring in the paperwork. She reports she was told he will forever live on a vent. She has three children to support. 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 TY spoke with bedside RN who states he will get a peg and trach and will need Long term SNF.   Kailee [place]     Case Management   [phone]    6/24/2019  3:23 PM

## 2019-06-24 NOTE — PROGRESS NOTES
[facility] Bedside handoff complete. VSS. Patient unresponsive. 0800-Assessment complete. Patient unresponsive without sedation. Patient has slight withdraw from pain from the left leg when nailbed pressure applied. Oral care complete. Breathing over vent, with no gag. No cough with deep suctioning. Pupils responsive. Patient flaccid with repositioning. Tylenol given for oral temp of 100.3.  0930-Mepelix on sacrum replaced. Patient repositioned. Oral Temp 101.4. Will monitor closely. 1100-Pulmonology at the bedside. Notified them of the increase in temperature. Orders given for blood, urine, and respiratory catheters. Orders to also remove central line and a-line. 1130-New olson placed to get the urine cx. Patient didn't react to olson placement at all. A-line and central line removed. Blood cx x1 drawn from the central line prior to removal. Lab called to obtain second culture from peripheral stick. 1245-General surgery at the bedside speaking with wife. 1400-Palliative care nurse in with the patient's wife. At this time there is no change to the code status. Wife states that the Imam is coming later today. 1600-No change from previous assessments. Oral care complete. VSS. Unable to get a Respiratory cx at this time. Patient doesn't have enough secretions to send to lab.    1800-VSS. No change. Patient remains stable. 1900-[facility].

## 2019-06-24 NOTE — CONSULTS
arrest- Vfib. Underwent LHC with BMS. CT head showed anoxic brain injury. Family wishing to continue life saving measures. We have been consulted regarding PEG placement    IMPRESSION:  1. Cardiac arrest s/p LHC with bare metal stenting. On Brillinta  2. Acute metabolic encephalopathy likely JOELLEN  3. Acute respiratory failure on vent support: surgery consulted for tracheostomy    RECOMMENDATIONS:    Family at bedside at time of visit and wishes to proceed with trach/peg. Pt on Brillinta. Will try to coordinate with Surgery PEG placement at time of Tracheostomy. If you have any questions or need any further information, please feel free to contact our consult team.  Thank you for allowing us to participate in the care of Jamison Quiles. Devon Garcia PA-C    Attending physician addendum:      I have personally seen and examined the patient, reviewed the patient's medical record and pertinent labs and clinical imaging. I have personally staffed the case with PHIL Goel. I agree with her consultation note, exam findings, assessment and plans  as written above. I have made appropriate modifications and edited her assessment and plan where needed to reflect my impression and plans for this patient. 52 yo man with cardiac arrest from AMI of LAD with resultant anoxic brain injury. We are consulted for PEG placement. Gen: intubated, not responsive  HEENT:  No jaundice  Heart:  RRR, no murmur  Lungs: CTA b/l  Abd: soft, NT, ND, + BS, no HSM  Ext : no edema   A/P:   EGD with PEG to be coordinated with Surgery and trach placement given patient's anticoagulation with Brillinta. Thank you for allowing me to participate in this patient's care. If there are any questions or concerns regarding this patient, or the plan we have set in place, please feel free to contact me at [phone].      Easton Villegas MD

## 2019-06-24 NOTE — PROGRESS NOTES
[facility]   Daily Progress Note      Admit Date:  6/20/2019    CC: STEMI    HPI:   Willa Chaparro is a 51 y.o. male with PMH hyperlipidemia. Patient admitted after being found unresponsive at home. According to family, he was on his treadmill 30-40 min prior to being found. EMS was called and patient was found to be in pulseless VF. CPR, Intubation, Epi and Amio given per protocol. In ED found to have STEMI per EKG. Emergent C performed >thrombectomy and BMS to LAD. Impella inserted and hypothermia protocol started then family withdrew care. Impella was turned off but he remained intubated. 3 hours later family requested to restart life saving measures. Head CT 6/23/19 with diffuse cerebral edema compatible with anoxic brain injury. Being followed by Neuro - per their assessment prognosis is grim for recovery of brain injury. Patients remains with little response today. His eyelids flutter when assessing pupils. He has no gag response to suction. Slight twitch LLE with painful stimuli to nailbeds. He is overbreathing the ventilator and is on 35% FIO2. Per RN at bedside, family maintains that it is against their Rastafarian beliefs to withdraw care. Primary team and neurology have discussed poor prognosis for meaningful recovery at length with the patient's wife and family. He is febrile today. Normal WBC. Getting pan cx and removing TLC. Review of Systems: Unable to assess D/T derpessed neurologic response.       Objective:   BP (!) 126/96   Pulse 100   Temp 100.3 °F (37.9 °C) (Oral)   Resp (!) 33   Ht 6' 2\" (1.88 m)   Wt 165 lb 5.5 oz (75 kg)   SpO2 100%   BMI 21.23 kg/m²        Intake/Output Summary (Last 24 hours) at 6/24/2019 0928  Last data filed at 6/24/2019 0926  Gross per 24 hour   Intake 530.4 ml   Output 2275 ml   Net -1744.6 ml       WEIGHT:Admit Weight: 187 lb 13.3 oz (85.2 kg)         Today  Weight: 165 lb 5.5 oz (75 kg)   DRY WEIGHT:  Wt Readings from Last 3  06/24/2019    ALT 77 06/24/2019    LIPASE 44.0 02/03/2016    AMYLASE 688 06/21/2019    LABALBU 3.6 06/24/2019    BILIDIR <0.2 06/24/2019    BILITOT 0.6 06/24/2019    ALKPHOS 58 06/24/2019     PT/INR:   Lab Results   Component Value Date    PROTIME 13.7 06/21/2019    INR 1.20 06/21/2019     APTT:   Lab Results   Component Value Date    APTT 63.2 06/21/2019     Pro-BNP:  No results found for: PROBNP  ENZYMES:  Lab Results   Component Value Date    TROPONINI <0.01 06/20/2019     FASTING LIPID PANEL:  Lab Results   Component Value Date    CHOL 282 06/21/2019    HDL 57 06/21/2019    LDLCALC 200 06/21/2019    TRIG 124 06/21/2019       Diagnostics:    Echo: 6/20/19  Left ventricular cavity size is normal. LVEF 30%  Viability of the basal anterorlateral and anterior myocardium.     Cath: 6/20/19  1.  Successful thrombus extraction with a Cl[employer]marks CAT RX device over a  BMW guidewire into the ostial LAD.  Then this lesion which was 75%  residual stenosis was stented with a 3.5 mm x 15-mm length Vision bare  metal stent.  The stent was deployed at 14 atmospheres x30 seconds with  AFSANEH 3 flow and nearly 0% residual stenosis. 2.  The right coronary artery, left main, and circumflex are all within  normal limits. 3.  LV ejection fraction 25% with pulmonary edema.  LVEDP 40 mmHg.  LVEF  25%. 4.  Insertion of a short-term external heart assist system into the  heart via the right femoral percutaneous approach using a 14-Kyr[place]  sheath.  This was done so as to provide assistance with cardiac output using  this device which was the Impeller pump continuously. Head CT W/O Contrast 6/23/19  FINDINGS:   BRAIN/VENTRICLES: There is no hemorrhage.  There is poor gray-white   differentiation especially in the hemispheric regions.  There is sulcal   effacement compatible with brain swelling.  Ventricles are centrally stable.    Suprasellar cisterns are maintained.       ORBITS: The visualized portion of the orbits demonstrate no

## 2019-06-24 NOTE — PROGRESS NOTES
Central line removed. Pressure held for 15 minutes. No complications, no excessive bleeding. Site assessed for hematoma, negative. Cap refill less than 3 seconds. Pressure dressing applied to site.

## 2019-06-24 NOTE — PROGRESS NOTES
Final Result   Life-support appliances are redemonstrated. Lungs appear grossly clear. XR CHEST PORTABLE   Final Result   Interval improvement in pulmonary edema. CT HEAD WO CONTRAST   Final Result   No acute intracranial abnormality. XR CHEST PORTABLE   Final Result   1. Diffuse pulmonary edema. 2. Endotracheal tube tip is located 3.5 cm above the tarun.                  Assessment/Plan:    Cardiac Arrest- V Fib  - underwent LHC with BMS  - Cardiology on board  - Impella device removed after family wanted to withdraw care, BP stable off impella and not on pressors  - continue current care  - amio drip restarted, now stopped  - no further cardiac workup    STEMI  - BMS placed in LAD    Fever  -suspect central fever given anoxic brain injury  -removed central line, A line, obtained cultures  -checking U/A and CXR  -hold Abx for now    Acute Resp Failure with Hypoxia  - continue vent  - no sedation   - coming down on FiO2  - family considering aggressive care with trach/PEG  - GI and general surgery consulted    Acute Metabolic Encephalopathy  - likely anoxic brain injury  - monitor  - CT with above results    Metabolic/Lactic Acidosis  - continue bicarb    DVT Prophylaxis: SCD  Diet: Diet NPO Effective Now  Code Status: Full Code    PT/OT Eval Status: NA    Dispo - ICU, poor prognosis, family wants to continue with aggressive care; palliative care consulted to discuss goals of care    If family continues to request aggressive care and is not interested in hospice, then LTAC would likely be the next step after hospitalization      Keren Baeza MD

## 2019-06-24 NOTE — CARE COORDINATION
Wife asked for a letter to provide to [facility] to provide to a casemanager so she can obtain a  voucher for the children. LSW wrote a non descripted letter and gave to wife that reads day of admit to the hospital and that he would need to be transferred to a long term care facility in the near future.   Serena Santiago [place]     Case Management   [phone]    6/24/2019  3:33 PM

## 2019-06-24 NOTE — CONSULTS
[] Teach Back       [] Needs Reinforcement     []  Other:      Teaching Time:  1hours  0 minutes     Plan        Social Service Consult Made:  Yes  Assistance filling out Living Will/HPOA:  No      Discharge Plan:  Providing support for coping/adaptation/distress of family  Palliative care orders introduced    Discharge Environment:  [x] ECF skilled care  [x] Other: LTAC     Discussion: Patient on vent, no sedation, not withdrawing to pain, no gag reflex. He was working out at home collapsed, wife called EMS he was brought to hospital down for approx 30 min per chart, had intervention family with silvia care in middle of night then decided to resume care. Patient lives with his wife, 3 children 2,5,7, works full time as . I have discussed goals of care with his wife she is still holding on hope he may recover. She feels it is against their Mandaeism to withdraw care and will discuss with the Imam he should becoming today. We have discussed trach and PEG and what that would entail, she wants to give him every opportunity to wake up. I will continue to follow Mr. Case Schmidt care as needed. Thank you for allowing me to participate in the care of Mr. Quiles .      Electronically signed by Ivett Mendosa RN, [facility] on 6/24/2019 at 3:35 PM  [address] [place] [address]  Office: [phone]

## 2019-06-24 NOTE — PROGRESS NOTES
Recent Labs     06/22/19  0655 06/23/19  0500 06/24/19  0520   WBC 13.7* 12.4* 9.7   HGB 12.4* 11.4* 11.3*   HCT 36.2* 33.6* 32.8*   MCV 89.9 90.8 91.0    212 185     BMP:   Recent Labs     06/21/19  1720 06/21/19  2340 06/22/19  0655 06/23/19  0500 06/24/19  0520    147* 141 147* 148*   K 4.5 4.6 4.3 4.2 3.8    107 102 105 105   CO2 16* 20* 22 26 27   PHOS 4.6 4.7 4.6  --   --    BUN 25* 33* 37* 41* 32*   CREATININE 1.6* 2.3* 2.2* 2.0* 1.5*     LIVER PROFILE:   Recent Labs     06/22/19  0655 06/24/19  0520   * 405*   * 77*   BILIDIR <0.2 <0.2   BILITOT 0.8 0.6   ALKPHOS 49 58     PT/INR:   No results for input(s): PROTIME, INR in the last 72 hours. APTT:   No results for input(s): APTT in the last 72 hours. UA:No results for input(s): NITRITE, COLORU, PHUR, LABCAST, WBCUA, RBCUA, MUCUS, TRICHOMONAS, YEAST, BACTERIA, CLARITYU, SPECGRAV, LEUKOCYTESUR, UROBILINOGEN, BILIRUBINUR, BLOODU, GLUCOSEU, AMORPHOUS in the last 72 hours. Invalid input(s): Marybeth Asencio  No results for input(s): PH, PCO2, PO2 in the last 72 hours. Films:  Radiology Review:  Pertinent images / reports were reviewed as a part of this visit. CT Chest w/ contrast: No results found for this or any previous visit. CT Chest w/o contrast: No results found for this or any previous visit. CTPA: No results found for this or any previous visit. CXR PA/LAT: No results found for this or any previous visit.     CXR portable:   Results for orders placed during the hospital encounter of 06/20/19   XR CHEST PORTABLE    Narrative EXAMINATION:  ONE XRAY VIEW OF THE CHEST    6/22/2019 3:38 am    COMPARISON:  06/20/2019    HISTORY:  ORDERING SYSTEM PROVIDED HISTORY: respiratory failure  TECHNOLOGIST PROVIDED HISTORY:  Reason for exam:->respiratory failure  Ordering Physician Provided Reason for Exam: respiratory failure  Type of Exam: Subsequent/Follow-up    FINDINGS:  Endotracheal tube tip projects over the mid

## 2019-06-25 LAB
ANION GAP SERPL CALCULATED.3IONS-SCNC: 16 MMOL/L (ref 3–16)
BASE EXCESS ARTERIAL: 7.8 MMOL/L (ref -3–3)
BASOPHILS ABSOLUTE: 0 K/UL (ref 0–0.2)
BASOPHILS RELATIVE PERCENT: 0.3 %
BUN BLDV-MCNC: 27 MG/DL (ref 7–20)
CALCIUM SERPL-MCNC: 8.8 MG/DL (ref 8.3–10.6)
CARBOXYHEMOGLOBIN ARTERIAL: 1.1 % (ref 0–1.5)
CHLORIDE BLD-SCNC: 104 MMOL/L (ref 99–110)
CO2: 30 MMOL/L (ref 21–32)
CREAT SERPL-MCNC: 1.2 MG/DL (ref 0.9–1.3)
EOSINOPHILS ABSOLUTE: 0 K/UL (ref 0–0.6)
EOSINOPHILS RELATIVE PERCENT: 0 %
GFR AFRICAN AMERICAN: >60
GFR NON-AFRICAN AMERICAN: >60
GLUCOSE BLD-MCNC: 127 MG/DL (ref 70–99)
HCO3 ARTERIAL: 31.3 MMOL/L (ref 21–29)
HCT VFR BLD CALC: 35.9 % (ref 40.5–52.5)
HEMOGLOBIN, ART, EXTENDED: 11.6 G/DL (ref 13.5–17.5)
HEMOGLOBIN: 12 G/DL (ref 13.5–17.5)
LYMPHOCYTES ABSOLUTE: 0.4 K/UL (ref 1–5.1)
LYMPHOCYTES RELATIVE PERCENT: 4.2 %
MCH RBC QN AUTO: 31 PG (ref 26–34)
MCHC RBC AUTO-ENTMCNC: 33.4 G/DL (ref 31–36)
MCV RBC AUTO: 92.7 FL (ref 80–100)
METHEMOGLOBIN ARTERIAL: 1 %
MONOCYTES ABSOLUTE: 0.8 K/UL (ref 0–1.3)
MONOCYTES RELATIVE PERCENT: 9.1 %
NEUTROPHILS ABSOLUTE: 7.6 K/UL (ref 1.7–7.7)
NEUTROPHILS RELATIVE PERCENT: 86.4 %
O2 CONTENT ARTERIAL: 16 ML/DL
O2 SAT, ARTERIAL: 96.9 %
O2 THERAPY: ABNORMAL
PCO2 ARTERIAL: 39.8 MMHG (ref 35–45)
PDW BLD-RTO: 13.3 % (ref 12.4–15.4)
PH ARTERIAL: 7.51 (ref 7.35–7.45)
PLATELET # BLD: 173 K/UL (ref 135–450)
PMV BLD AUTO: 8.9 FL (ref 5–10.5)
PO2 ARTERIAL: 82.9 MMHG (ref 75–108)
POTASSIUM SERPL-SCNC: 3.4 MMOL/L (ref 3.5–5.1)
RBC # BLD: 3.87 M/UL (ref 4.2–5.9)
SODIUM BLD-SCNC: 150 MMOL/L (ref 136–145)
TCO2 ARTERIAL: 32.5 MMOL/L
URINE CULTURE, ROUTINE: NORMAL
WBC # BLD: 8.9 K/UL (ref 4–11)

## 2019-06-25 PROCEDURE — 94003 VENT MGMT INPAT SUBQ DAY: CPT

## 2019-06-25 PROCEDURE — 80048 BASIC METABOLIC PNL TOTAL CA: CPT

## 2019-06-25 PROCEDURE — 99233 SBSQ HOSP IP/OBS HIGH 50: CPT | Performed by: INTERNAL MEDICINE

## 2019-06-25 PROCEDURE — 82803 BLOOD GASES ANY COMBINATION: CPT

## 2019-06-25 PROCEDURE — 2100000000 HC CCU R&B

## 2019-06-25 PROCEDURE — 6370000000 HC RX 637 (ALT 250 FOR IP): Performed by: INTERNAL MEDICINE

## 2019-06-25 PROCEDURE — 6360000002 HC RX W HCPCS: Performed by: NURSE PRACTITIONER

## 2019-06-25 PROCEDURE — 2580000003 HC RX 258: Performed by: INTERNAL MEDICINE

## 2019-06-25 PROCEDURE — 6370000000 HC RX 637 (ALT 250 FOR IP): Performed by: NURSE PRACTITIONER

## 2019-06-25 PROCEDURE — 99232 SBSQ HOSP IP/OBS MODERATE 35: CPT | Performed by: NURSE PRACTITIONER

## 2019-06-25 PROCEDURE — 36415 COLL VENOUS BLD VENIPUNCTURE: CPT

## 2019-06-25 PROCEDURE — 99223 1ST HOSP IP/OBS HIGH 75: CPT | Performed by: SURGERY

## 2019-06-25 PROCEDURE — 94750 HC PULMONARY COMPLIANCE STUDY: CPT

## 2019-06-25 PROCEDURE — 6360000002 HC RX W HCPCS: Performed by: INTERNAL MEDICINE

## 2019-06-25 PROCEDURE — 2500000003 HC RX 250 WO HCPCS: Performed by: PSYCHIATRY & NEUROLOGY

## 2019-06-25 PROCEDURE — 2580000003 HC RX 258: Performed by: PSYCHIATRY & NEUROLOGY

## 2019-06-25 PROCEDURE — 2700000000 HC OXYGEN THERAPY PER DAY

## 2019-06-25 PROCEDURE — 85025 COMPLETE CBC W/AUTO DIFF WBC: CPT

## 2019-06-25 PROCEDURE — 94761 N-INVAS EAR/PLS OXIMETRY MLT: CPT

## 2019-06-25 PROCEDURE — APPSS180 APP SPLIT SHARED TIME > 60 MINUTES: Performed by: NURSE PRACTITIONER

## 2019-06-25 RX ORDER — CARVEDILOL 6.25 MG/1
6.25 TABLET ORAL 2 TIMES DAILY WITH MEALS
Status: DISCONTINUED | OUTPATIENT
Start: 2019-06-25 | End: 2019-07-06 | Stop reason: HOSPADM

## 2019-06-25 RX ORDER — LEVETIRACETAM 10 MG/ML
1000 INJECTION INTRAVASCULAR 2 TIMES DAILY
Status: DISCONTINUED | OUTPATIENT
Start: 2019-06-25 | End: 2019-07-03

## 2019-06-25 RX ADMIN — FAMOTIDINE 20 MG: 20 TABLET ORAL at 09:29

## 2019-06-25 RX ADMIN — CARVEDILOL 6.25 MG: 6.25 TABLET, FILM COATED ORAL at 09:29

## 2019-06-25 RX ADMIN — FAMOTIDINE 20 MG: 20 TABLET ORAL at 20:48

## 2019-06-25 RX ADMIN — LEVETIRACETAM 1000 MG: 10 INJECTION INTRAVENOUS at 21:54

## 2019-06-25 RX ADMIN — VALPROATE SODIUM 500 MG: 100 INJECTION, SOLUTION INTRAVENOUS at 03:45

## 2019-06-25 RX ADMIN — VALPROATE SODIUM 500 MG: 100 INJECTION, SOLUTION INTRAVENOUS at 12:37

## 2019-06-25 RX ADMIN — ASPIRIN 81 MG 81 MG: 81 TABLET ORAL at 09:29

## 2019-06-25 RX ADMIN — TIROFIBAN 0.15 MCG/KG/MIN: 5 INJECTION, SOLUTION INTRAVENOUS at 17:15

## 2019-06-25 RX ADMIN — TICAGRELOR 90 MG: 90 TABLET ORAL at 09:29

## 2019-06-25 RX ADMIN — CHLORHEXIDINE GLUCONATE 0.12% ORAL RINSE 15 ML: 1.2 LIQUID ORAL at 09:29

## 2019-06-25 RX ADMIN — VALPROATE SODIUM 500 MG: 100 INJECTION, SOLUTION INTRAVENOUS at 20:36

## 2019-06-25 RX ADMIN — Medication 10 ML: at 20:47

## 2019-06-25 RX ADMIN — CHLORHEXIDINE GLUCONATE 0.12% ORAL RINSE 15 ML: 1.2 LIQUID ORAL at 20:47

## 2019-06-25 RX ADMIN — CARVEDILOL 6.25 MG: 6.25 TABLET, FILM COATED ORAL at 17:15

## 2019-06-25 ASSESSMENT — PAIN SCALES - GENERAL
PAINLEVEL_OUTOF10: 0

## 2019-06-25 ASSESSMENT — PULMONARY FUNCTION TESTS
PIF_VALUE: 13
PIF_VALUE: 18
PIF_VALUE: 12
PIF_VALUE: 10
PIF_VALUE: 13
PIF_VALUE: 12
PIF_VALUE: 21
PIF_VALUE: 12
PIF_VALUE: 13
PIF_VALUE: 13
PIF_VALUE: 11
PIF_VALUE: 11
PIF_VALUE: 12
PIF_VALUE: 14
PIF_VALUE: 13
PIF_VALUE: 12
PIF_VALUE: 13
PIF_VALUE: 19

## 2019-06-25 NOTE — PROGRESS NOTES
redemonstrated. Lungs appear grossly clear. XR CHEST PORTABLE   Final Result   Interval improvement in pulmonary edema. CT HEAD WO CONTRAST   Final Result   No acute intracranial abnormality. XR CHEST PORTABLE   Final Result   1. Diffuse pulmonary edema. 2. Endotracheal tube tip is located 3.5 cm above the tarun.                  Assessment/Plan:    Cardiac Arrest- V Fib  - underwent LHC with BMS  - Cardiology on board  - Impella device removed after family wanted to withdraw care, BP stable off impella and not on pressors  - continue current care  - amio drip restarted, now stopped  - no further cardiac workup    STEMI  - BMS placed in LAD    Fever  -suspect central fever given anoxic brain injury  -removed central line, A line, obtained cultures  -checking U/A and CXR  -hold Abx for now    Acute Resp Failure with Hypoxia  - continue vent  - no sedation   - coming down on FiO2  - family wants to pursue aggressive care with trach/PEG but will need to transition from [address] to aggrastat prior to procedure  - GI and general surgery consulted and are coordinating timing of procedures    Acute Metabolic Encephalopathy  - likely anoxic brain injury  - monitor  - CT with above results    Metabolic/Lactic Acidosis  - continue bicarb    DVT Prophylaxis: SCD  Diet: Diet NPO Effective Now  Code Status: Full Code    PT/OT Eval Status: NA    Dispo - ICU, poor prognosis, family wants to continue with aggressive care; palliative care consulted to discuss goals of care    If family continues to request aggressive care and is not interested in hospice, then LTAC would likely be the next step after hospitalization      Tessa Pham MD

## 2019-06-25 NOTE — PROGRESS NOTES
high risk for clinical deterioration due to coma, and anoxic brain injury that is necessitating ventilator dependence. I have no further recommendations at this time, pulmonary/critical care services remain available if any further assistance is needed.      Thank you for this consult,    Janna Godoy [address] [place] [address] Pulmonary, Critical Care, and Sleep Medicine

## 2019-06-25 NOTE — CONSULTS
normal.  CHEST/LUNGS: chest symmetric with normal A/P diameter, normal respiratory rate and rhythm, lungs clear to auscultation without wheezes, rales or rhonchi. No accessory muscle use. CARDIOVASCULAR: Heart sounds are normal.  Regular rate and rhythm without murmur, gallop or rub. Carotid and femoral pulses 2+/4 and equal bilaterally. ABDOMEN: Soft, non distended. No hernias scars or masses. RECTAL: deferred, not clinically indicated  NEUROLOGIC: There are no focalizing motor or sensory deficits. CN II-XII are grossly intact. Emma Peaks EXTREMITIES: no cyanosis, no clubbing and no edema. LABS:     Recent Labs     06/23/19  0500 06/24/19  0520 06/24/19  1120 06/25/19  0432   WBC 12.4* 9.7  --  8.9   HGB 11.4* 11.3*  --  12.0*   HCT 33.6* 32.8*  --  35.9*    185  --  173   * 148*  --  150*   K 4.2 3.8  --  3.4*    105  --  104   CO2 26 27  --  30   BUN 41* 32*  --  27*   CREATININE 2.0* 1.5*  --  1.2   CALCIUM 8.3 8.5  --  8.8   AST  --  405*  --   --    ALT  --  77*  --   --    BILITOT  --  0.6  --   --    BILIDIR  --  <0.2  --   --    NITRU  --   --  Negative  --    COLORU  --   --  YELLOW  --    BACTERIA  --   --  Rare*  --      Recent Labs     06/24/19  0520   ALKPHOS 58   ALT 77*   *   BILITOT 0.6   BILIDIR <0.2   LABALBU 3.6         RADIOLOGY:   I have personally reviewed the following films:  XR CHEST PORTABLE   Final Result   1. No significant change. CT Head WO Contrast   Final Result   Findings are compatible with developing cerebral edema in keeping with a not   sick brain injury         XR CHEST PORTABLE   Final Result   Life-support appliances are redemonstrated. Lungs appear grossly clear. XR CHEST PORTABLE   Final Result   Interval improvement in pulmonary edema. CT HEAD WO CONTRAST   Final Result   No acute intracranial abnormality. XR CHEST PORTABLE   Final Result   1. Diffuse pulmonary edema.    2. Endotracheal tube tip is located 3.5 cm above the tarun. Thank you for the interesting evaluation. Further recommendations to follow. Electronically signed by ALEJANDRA Dominguez - CNP on 6/25/2019 at 12:09 PM      Surgery Staff  I have examined this patient and read and agree with the note by ALEJANDRA Reese from today. Events of the last week reviewed. Patient with anoxic encephalopathy s/p cardiac arrest. Family wants to pursue trach/PEG despite multiple discussions of no meaningful neurologic recovery    Discussed risks/benefits of trach with wife. Unfortunately he is on brillinta after recent cardiac stent. It will need to be held 5 days and transitioned to aggrestat. Will defer to cardiology.   Tentatively plan for early next week if family wishes to proceed  Electronically signed by Ursula Dancer, MD on 6/25/2019 at 2:04 PM

## 2019-06-26 LAB
ANION GAP SERPL CALCULATED.3IONS-SCNC: 18 MMOL/L (ref 3–16)
BASE EXCESS ARTERIAL: 6.9 MMOL/L (ref -3–3)
BASOPHILS ABSOLUTE: 0 K/UL (ref 0–0.2)
BASOPHILS RELATIVE PERCENT: 0.2 %
BUN BLDV-MCNC: 41 MG/DL (ref 7–20)
CALCIUM SERPL-MCNC: 9 MG/DL (ref 8.3–10.6)
CARBOXYHEMOGLOBIN ARTERIAL: 1.2 % (ref 0–1.5)
CHLORIDE BLD-SCNC: 104 MMOL/L (ref 99–110)
CO2: 29 MMOL/L (ref 21–32)
CREAT SERPL-MCNC: 1.5 MG/DL (ref 0.9–1.3)
EOSINOPHILS ABSOLUTE: 0 K/UL (ref 0–0.6)
EOSINOPHILS RELATIVE PERCENT: 0.3 %
GFR AFRICAN AMERICAN: >60
GFR NON-AFRICAN AMERICAN: 50
GLUCOSE BLD-MCNC: 112 MG/DL (ref 70–99)
HCO3 ARTERIAL: 29.6 MMOL/L (ref 21–29)
HCT VFR BLD CALC: 33.7 % (ref 40.5–52.5)
HEMOGLOBIN, ART, EXTENDED: 11.5 G/DL (ref 13.5–17.5)
HEMOGLOBIN: 11.6 G/DL (ref 13.5–17.5)
LYMPHOCYTES ABSOLUTE: 0.4 K/UL (ref 1–5.1)
LYMPHOCYTES RELATIVE PERCENT: 4.9 %
MCH RBC QN AUTO: 32.1 PG (ref 26–34)
MCHC RBC AUTO-ENTMCNC: 34.5 G/DL (ref 31–36)
MCV RBC AUTO: 93.2 FL (ref 80–100)
METHEMOGLOBIN ARTERIAL: 0.8 %
MONOCYTES ABSOLUTE: 1.2 K/UL (ref 0–1.3)
MONOCYTES RELATIVE PERCENT: 14.1 %
NEUTROPHILS ABSOLUTE: 6.7 K/UL (ref 1.7–7.7)
NEUTROPHILS RELATIVE PERCENT: 80.5 %
O2 CONTENT ARTERIAL: 15 ML/DL
O2 SAT, ARTERIAL: 96.1 %
O2 THERAPY: ABNORMAL
PCO2 ARTERIAL: 35.2 MMHG (ref 35–45)
PDW BLD-RTO: 13 % (ref 12.4–15.4)
PH ARTERIAL: 7.53 (ref 7.35–7.45)
PLATELET # BLD: 194 K/UL (ref 135–450)
PMV BLD AUTO: 9.4 FL (ref 5–10.5)
PO2 ARTERIAL: 72.9 MMHG (ref 75–108)
POTASSIUM SERPL-SCNC: 3.6 MMOL/L (ref 3.5–5.1)
RBC # BLD: 3.62 M/UL (ref 4.2–5.9)
SODIUM BLD-SCNC: 151 MMOL/L (ref 136–145)
TCO2 ARTERIAL: 30.7 MMOL/L
WBC # BLD: 8.3 K/UL (ref 4–11)

## 2019-06-26 PROCEDURE — 80048 BASIC METABOLIC PNL TOTAL CA: CPT

## 2019-06-26 PROCEDURE — 6370000000 HC RX 637 (ALT 250 FOR IP): Performed by: INTERNAL MEDICINE

## 2019-06-26 PROCEDURE — 2100000000 HC CCU R&B

## 2019-06-26 PROCEDURE — 6370000000 HC RX 637 (ALT 250 FOR IP): Performed by: NURSE PRACTITIONER

## 2019-06-26 PROCEDURE — 2700000000 HC OXYGEN THERAPY PER DAY

## 2019-06-26 PROCEDURE — 94750 HC PULMONARY COMPLIANCE STUDY: CPT

## 2019-06-26 PROCEDURE — 99232 SBSQ HOSP IP/OBS MODERATE 35: CPT | Performed by: SURGERY

## 2019-06-26 PROCEDURE — 94003 VENT MGMT INPAT SUBQ DAY: CPT

## 2019-06-26 PROCEDURE — 6360000002 HC RX W HCPCS: Performed by: NURSE PRACTITIONER

## 2019-06-26 PROCEDURE — 95819 EEG AWAKE AND ASLEEP: CPT

## 2019-06-26 PROCEDURE — 36415 COLL VENOUS BLD VENIPUNCTURE: CPT

## 2019-06-26 PROCEDURE — APPNB30 APP NON BILLABLE TIME 0-30 MINS: Performed by: NURSE PRACTITIONER

## 2019-06-26 PROCEDURE — 2580000003 HC RX 258: Performed by: INTERNAL MEDICINE

## 2019-06-26 PROCEDURE — 36600 WITHDRAWAL OF ARTERIAL BLOOD: CPT

## 2019-06-26 PROCEDURE — 82803 BLOOD GASES ANY COMBINATION: CPT

## 2019-06-26 PROCEDURE — 2580000003 HC RX 258: Performed by: NURSE PRACTITIONER

## 2019-06-26 PROCEDURE — 2580000003 HC RX 258: Performed by: PSYCHIATRY & NEUROLOGY

## 2019-06-26 PROCEDURE — 94761 N-INVAS EAR/PLS OXIMETRY MLT: CPT

## 2019-06-26 PROCEDURE — 85025 COMPLETE CBC W/AUTO DIFF WBC: CPT

## 2019-06-26 PROCEDURE — 6360000002 HC RX W HCPCS: Performed by: INTERNAL MEDICINE

## 2019-06-26 PROCEDURE — 2500000003 HC RX 250 WO HCPCS: Performed by: PSYCHIATRY & NEUROLOGY

## 2019-06-26 PROCEDURE — APPSS30 APP SPLIT SHARED TIME 16-30 MINUTES: Performed by: NURSE PRACTITIONER

## 2019-06-26 PROCEDURE — 99291 CRITICAL CARE FIRST HOUR: CPT | Performed by: INTERNAL MEDICINE

## 2019-06-26 RX ADMIN — Medication 10 ML: at 20:48

## 2019-06-26 RX ADMIN — CEFEPIME HYDROCHLORIDE 2 G: 2 INJECTION, POWDER, FOR SOLUTION INTRAVENOUS at 17:39

## 2019-06-26 RX ADMIN — CARVEDILOL 6.25 MG: 6.25 TABLET, FILM COATED ORAL at 17:38

## 2019-06-26 RX ADMIN — CHLORHEXIDINE GLUCONATE 0.12% ORAL RINSE 15 ML: 1.2 LIQUID ORAL at 09:28

## 2019-06-26 RX ADMIN — FAMOTIDINE 20 MG: 20 TABLET ORAL at 20:57

## 2019-06-26 RX ADMIN — VALPROATE SODIUM 500 MG: 100 INJECTION, SOLUTION INTRAVENOUS at 04:44

## 2019-06-26 RX ADMIN — VALPROATE SODIUM 500 MG: 100 INJECTION, SOLUTION INTRAVENOUS at 12:15

## 2019-06-26 RX ADMIN — LEVETIRACETAM 1000 MG: 10 INJECTION INTRAVENOUS at 10:06

## 2019-06-26 RX ADMIN — CHLORHEXIDINE GLUCONATE 0.12% ORAL RINSE 15 ML: 1.2 LIQUID ORAL at 20:41

## 2019-06-26 RX ADMIN — CARVEDILOL 6.25 MG: 6.25 TABLET, FILM COATED ORAL at 09:28

## 2019-06-26 RX ADMIN — ACETAMINOPHEN 650 MG: 325 TABLET ORAL at 10:27

## 2019-06-26 RX ADMIN — ASPIRIN 81 MG 81 MG: 81 TABLET ORAL at 09:28

## 2019-06-26 RX ADMIN — VALPROATE SODIUM 500 MG: 100 INJECTION, SOLUTION INTRAVENOUS at 20:47

## 2019-06-26 RX ADMIN — VANCOMYCIN HYDROCHLORIDE 1000 MG: 1 INJECTION, POWDER, LYOPHILIZED, FOR SOLUTION INTRAVENOUS at 18:38

## 2019-06-26 RX ADMIN — TIROFIBAN 0.15 MCG/KG/MIN: 5 INJECTION, SOLUTION INTRAVENOUS at 11:18

## 2019-06-26 RX ADMIN — LEVETIRACETAM 1000 MG: 10 INJECTION INTRAVENOUS at 22:04

## 2019-06-26 RX ADMIN — FAMOTIDINE 20 MG: 20 TABLET ORAL at 09:28

## 2019-06-26 ASSESSMENT — PULMONARY FUNCTION TESTS
PIF_VALUE: 10
PIF_VALUE: 13
PIF_VALUE: 13
PIF_VALUE: 12
PIF_VALUE: 13
PIF_VALUE: 13
PIF_VALUE: 12
PIF_VALUE: 12
PIF_VALUE: 11
PIF_VALUE: 14
PIF_VALUE: 13
PIF_VALUE: 11
PIF_VALUE: 13
PIF_VALUE: 9
PIF_VALUE: 15
PIF_VALUE: 12
PIF_VALUE: 13
PIF_VALUE: 12
PIF_VALUE: 13
PIF_VALUE: 12
PIF_VALUE: 7
PIF_VALUE: 12
PIF_VALUE: 12

## 2019-06-26 ASSESSMENT — PAIN SCALES - GENERAL
PAINLEVEL_OUTOF10: 0

## 2019-06-26 NOTE — PROGRESS NOTES
Hospitalist Progress Note      PCP: Ferny Collazo MD    Date of Admission: 6/20/2019    Chief Complaint: [facility] Course: Patient brought to ED after being found down for approx 30 minutes. Patient found pulseless and in V fib by EMS. CPR initiated and multiple rounds of epinephrine given. Patient was given Amio bolus, bicarb and narcan prior to arrival. Once in ED patient was found to have EKG with marked ST elevations. Lactic acid was 26, pH was 6.8. Cardiology was consulted and performed emergent LHC and found LAD bloackage with EF of 30%, thrombectomy also performed. After impella, central line and art line were placed patients family decided to withdraw care. Patient was undergoing hypothermia protocol at the time so all care was stopped. Three hours later patients family/POA/wife asked that all care be restarted. Patient was placed back on the ventilator after ABG showed poor oxygenation and patient's inability to maintain his own airway. CT on D3 of admission shows diffuse cerebral edema compatible with anoxic brain injury. Subjective: Patient seen and examined. Has pupillary reflexes but not withdrawing to any pain, absent gag reflex. Essentially unchanged from yesterday.     Medications:  Reviewed    Infusion Medications    tirofiban 0.15 mcg/kg/min (06/26/19 1118)    dextrose       Scheduled Medications    carvedilol  6.25 mg Oral BID WC    levetiracetam  1,000 mg Intravenous BID    chlorhexidine  15 mL Mouth/Throat BID    aspirin  81 mg Oral Daily    valproate sodium (DEPACON) IVPB  500 mg Intravenous Q8H    sodium chloride flush  10 mL Intravenous 2 times per day    famotidine  20 mg Oral BID     PRN Meds: ondansetron, glucose, dextrose, glucagon (rDNA), dextrose, sodium chloride flush, acetaminophen, magnesium hydroxide      Intake/Output Summary (Last 24 hours) at 6/26/2019 1447  Last data filed at 6/26/2019 1238  Gross per 24 hour   Intake 930.6 ml   Output 1550 ml Net -619.4 ml       Physical Exam Performed:    /76   Pulse 116   Temp 100.7 °F (38.2 °C) (Oral)   Resp 25   Ht 6' 2\" (1.88 m)   Wt 160 lb 7.9 oz (72.8 kg)   SpO2 99%   BMI 20.61 kg/m²     General appearance: intubated, unresponsive  HEENT: Pupils dilated, reactive to light  Neck: Supple, with full range of motion. No jugular venous distention. Trachea midline. Respiratory:  Bronchial BS BL  Cardiovascular: Regular rate and rhythm with normal S1/S2   Abdomen: Soft, non-tender, non-distended with normal bowel sounds. Musculoskeletal: No clubbing, cyanosis or edema bilaterally. Skin: Skin color, texture, turgor normal.  No rashes or lesions. Neurologic:  neg babinski bl, absent corneal reflex, pupils dilated, upward gaze  Psychiatric: intubated, unresponsive  Capillary Refill: Brisk,< 3 seconds   Peripheral Pulses: +2 palpable, equal bilaterally       Labs:   Recent Labs     06/24/19 0520 06/25/19 0432 06/26/19 0430   WBC 9.7 8.9 8.3   HGB 11.3* 12.0* 11.6*   HCT 32.8* 35.9* 33.7*    173 194     Recent Labs     06/24/19 0520 06/25/19 0432 06/26/19  0430   * 150* 151*   K 3.8 3.4* 3.6    104 104   CO2 27 30 29   BUN 32* 27* 41*   CREATININE 1.5* 1.2 1.5*   CALCIUM 8.5 8.8 9.0     Recent Labs     06/24/19  0520   *   ALT 77*   BILIDIR <0.2   BILITOT 0.6   ALKPHOS 58     No results for input(s): INR in the last 72 hours. No results for input(s): Shon Romo in the last 72 hours. Urinalysis:      Lab Results   Component Value Date    NITRU Negative 06/24/2019    WBCUA 13 06/24/2019    BACTERIA Rare 06/24/2019    RBCUA 7 06/24/2019    BLOODU LARGE 06/24/2019    SPECGRAV 1.020 06/24/2019    GLUCOSEU Negative 06/24/2019       Radiology:  XR CHEST PORTABLE   Final Result   1. No significant change.          CT Head WO Contrast   Final Result   Findings are compatible with developing cerebral edema in keeping with a not   sick brain injury         XR CHEST PORTABLE

## 2019-06-26 NOTE — PROGRESS NOTES
06/26/2019 1916 - Bedside shift hand-off done w/ off-going RN Jackelyn Salazar. Pt. is intubated, calm, ST on the monitor, not in any distress, w/ OGT that's clamped, w/ intact olson catheter, and on Aggrastat drip @ 0.15 mcg/kg/min. Family is at the bedside. 2037 - Shift assessment completed. His eyelids are fluttering, but eyes are not tracking. He doesn't follow any commands, no response to nailbed pressure, and w/ both pupils equal and sluggishly reactive to light (4 mm in size). 2040 - This RN did suction his ETT and he gagged 2 out of 3 times this RN pushed the in-line suction catheter down his trachea. Moderate amounts of thick and tan ETT secretions were obtained. 1 - Was able to suction moderate amounts of thick and tan ETT secretions. No tremors observed since the beginning of this shift.    06/27/2019    0010 - Reassessment unchanged. 0021 - Was able to suction moderate amounts of thick and tan ETT secretions. 0023 - T = 100.8 F. Tylenol 650 mg was given through his OGT. Cool washcloth was placed over his forehead.    0109 - Wife arrived at the bedside. 80 - Temp is a little better @ 100.5 F. This RN suctioned him again endotracheally and a moderate amount of thick and tan secretions were obtained. 8387 - Pt.'s wife left the bedside. 0419 - T = 99.6 F. Still no response to nailbed pressure on all extremities, doesn't follow commands, w/ gag reflex sometimes w/ deep ETT suctioning, may open his eyes a little but doesn't track. No observed tremors since this shift started. 0 - Got some more moderate amounts of thick and tan ETT secretions. Ref.  Range 6/27/2019 04:35   Sodium Latest Ref Range: 136 - 145 mmol/L 153 (H)   Potassium Latest Ref Range: 3.5 - 5.1 mmol/L 3.2 (L)   BUN Latest Ref Range: 7 - 20 mg/dL 45 (H)   Creatinine Latest Ref Range: 0.9 - 1.3 mg/dL 1.6 (H)   Calcium Latest Ref Range: 8.3 - 10.6 mg/dL 8.0 (L)     3644 - Informed Critical Care NP Chelo Poisson about

## 2019-06-26 NOTE — PROGRESS NOTES
Pulmonary Progress Note    Date of Admission: 6/20/2019   LOS: 6 days     CC:  Chief Complaint   Patient presents with    Cardiac Arrest     working out in the basement; unknown downtime last seen 40 minutes prior to ems arrival by family       HPI/Subjective  No meaningful neurologic recovery  EEG pending  Staph and gram-negative rods and respiratory culture    ROS: Unable to obtain due to mechanical ventilation and comatose      Intake/Output Summary (Last 24 hours) at 6/26/2019 1614  Last data filed at 6/26/2019 1500  Gross per 24 hour   Intake 1183.77 ml   Output 1800 ml   Net -616.23 ml         PHYSICAL EXAM:   Blood pressure 109/72, pulse 108, temperature 99.7 °F (37.6 °C), temperature source Oral, resp. rate 27, height 6' 2\" (1.88 m), weight 160 lb 7.9 oz (72.8 kg), SpO2 94 %.'  Gen:  No acute distress. Eyes: disconjugate gaze. Anicteric sclera. No conjunctival injection. ENT: No discharge. Posterior oropharynx with ET tube. External appearance of ears and nose normal.  Neck: Trachea midline. No mass   Resp: + Crackles. No wheezes. No rhonchi. No dullness on percussion. CV: Tachycardia. Regular rhythm. No murmur or rub. No edema. GI: Soft, Non-tender. Non-distended. +BS  Skin: Warm, dry, w/o erythema. Lymph: No cervical or supraclavicular LAD. M/S: No cyanosis. No clubbing. Neuro: Comatose, intermittent response to painful stimuli, no purposeful movements. No cough no gag reflex.     Medications:    Scheduled Meds:   carvedilol  6.25 mg Oral BID WC    levetiracetam  1,000 mg Intravenous BID    chlorhexidine  15 mL Mouth/Throat BID    aspirin  81 mg Oral Daily    valproate sodium (DEPACON) IVPB  500 mg Intravenous Q8H    sodium chloride flush  10 mL Intravenous 2 times per day    famotidine  20 mg Oral BID       Continuous Infusions:   tirofiban 0.15 mcg/kg/min (06/26/19 1118)    dextrose         PRN Meds:  ondansetron, glucose, dextrose, glucagon (rDNA), dextrose, sodium chloride

## 2019-06-26 NOTE — CONSULTS
Clinical Pharmacy Note  Vancomycin Consult    Emma Marshall is a 51 y.o. male ordered Vancomycin for pneumonia; consult received from Dary Mckeon to manage therapy. Also receiving Cefepime. Patient Active Problem List   Diagnosis    Mixed hyperlipidemia    Leukopenia    [facility] ([facility]    ST elevation myocardial infarction (STEMI) ([facility]    Pulmonary edema cardiac cause ([facility]    Cardiogenic shock (HCC)    Ventricular fibrillation (HCC)    Anoxic encephalopathy (HCC)    Acute respiratory failure with hypoxia (HCC)    Acute pulmonary edema (HCC)    Metabolic acidosis    Lactic acidosis    YRUI (acute kidney injury) ([facility]    Transaminitis    STEMI involving left anterior descending coronary artery (HCC)    Acute systolic heart failure (HCC)    Ischemic cardiomyopathy       Allergies:  Patient has no known allergies. Temp max:  Temp (24hrs), Av.2 °F (37.9 °C), Min:98.7 °F (37.1 °C), Max:102.9 °F (39.4 °C)      Recent Labs     19  0520 19  0432 19  0430   WBC 9.7 8.9 8.3       Recent Labs     19  0520 19  0432 19  0430   BUN 32* 27* 41*   CREATININE 1.5* 1.2 1.5*         Intake/Output Summary (Last 24 hours) at 2019 1730  Last data filed at 2019 1500  Gross per 24 hour   Intake 1183.77 ml   Output 1600 ml   Net -416.23 ml       Culture Results:  Staph in respiratory culture    Ht Readings from Last 1 Encounters:   19 6' 2\" (1.88 m)        Wt Readings from Last 1 Encounters:   19 160 lb 7.9 oz (72.8 kg)         Estimated Creatinine Clearance: 62 mL/min (A) (based on SCr of 1.5 mg/dL (H)). Assessment/Plan:  Will initiate vancomycin 1000 mg IV once. Will get a random level in the AM. Regimen projects a trough level of 15-20 mg/L. Timing of trough level will be determined based on culture results, renal function, and clinical response. Thank you for the consult. Will continue to follow.   Caroline Clement [facility],2019,5:30

## 2019-06-26 NOTE — PROGRESS NOTES
seizures  remain a clinical concern, then a repeat EEG may be of further benefit. Clinical correlation is highly advised. ASSESSMENT:  51 y.o. male with a PMH of HLD who presented 6/20/2019 with cardiac arrest- Vfib. Underwent LHC with BMS. CT head showed anoxic brain injury. Family wishing to continue life saving measures. We have been consulted regarding PEG placement     IMPRESSION:  1. Cardiac arrest s/p LHC with bare metal stenting. Was on Brillinta, but this was stopped on 6/25/19 and he has been on Aggrastat gtt since then. 2. Acute metabolic encephalopathy likely JOELLEN  3. Acute respiratory failure on vent support: surgery consulted for tracheostomy     RECOMMENDATIONS:    Family at bedside at time of visit and wishes to proceed with trach/peg. Pt on Aggrastat gtt now, last Brilinta dose given 6/25/19. Will coordinate with Surgery PEG placement at time of Tracheostomy. Thank you for allowing me to participate in the care of your patient. Feel free to contact me with any questions or concerns.     Milton Cyr MD

## 2019-06-26 NOTE — PROGRESS NOTES
77*  --   --   --    BILITOT 0.6  --   --   --    BILIDIR <0.2  --   --   --    NITRU  --  Negative  --   --    COLORU  --  YELLOW  --   --    BACTERIA  --  Rare*  --   --     < > = values in this interval not displayed. EDUCATION  Patient educated about Disease Process, Medications, Smoking Cessation, Oxygenation, Incentive Spirometry and Deep Breath and Cough, Diabetes, Hyperlipidemia, Smoking Cessation, Nutrition, Exercise and Hypertension    Electronically signed by ALEJANDRA Oliveira CNP on 6/26/19 at 10:43 AM     [facility]   [phone] Office  [phone] Cell       Surgery Staff  I have examined this patient and read and agree with the note by ALEJANDRA Peace from today. Seizures overnight. Await EEG report  On aggrastat being transitioned off Brilinta  Possible trach early next week.    Electronically signed by Surinder Kang MD on 6/26/2019 at 12:53 PM

## 2019-06-26 NOTE — PROGRESS NOTES
06/25/2019 1922 - Bedside shift hand-off done w/ off-going RN Matthew Taylor. He is intubated, breathing over the vent, not in any distress, ST on the monitor, and on Aggrastat drip @ 0.15 mcg/kg/min. Pt.'s sister is at the bedside. 1944 - Had fine tremors in the face and L arm/hand. Face tremors went away first (LUE tremors went on). Tremors lasted for 9 minutes (started @ 1935). No change in vitals. 56 - Informed Dr. Adarsh Cristobal, through [address] [place] [address], about pt.'s history, hospital course, the tremors, and that pt. is on Valproate 500 mg IV every 8 hours. 80 - Dr. Adarsh Cristobal replied and said that there's nothing to add, and that it's a sequela of anoxic brain injury and this tends to get worse before the pt. eventually dies. Sister at the bedside was updated of what Dr. Adarsh Cristobal said, and that there are no new orders. 2034 - Had facial and L arm/hand tremors again. This time, it lasted for 14 minutes (started @ 2020) and both the face and LUE tremors stopped at the same time. No change in vitals. Pt.'s sister and brother is at the bedside. 2112 -  Informed Dr. Aric Bellamy, through [address] [place] [address], about pt.'s history, hospital course, pt.'s assessment, the 1st tremor at shift change, and the 2nd tremor a few minutes ago, and that pt. is on Valproate 500 mg IV every 8 hours. This RN also informed him that the Hospitalist was aware of the 1st tremor and that no new orders were made and MD said that it's a sequela of anoxic brain injury. 2123 - Had another facial and L arm/ hand tremors that lasted for 4 minutes (started @ 2119). This started when this RN did ETT suctioning. He no gag reflex when his ETT was suctioned. No secretions obtained. 2124 - Coughed a few times when he was turned to the L side. He still doesn't have any response to nailbed pressure in any extremity. His ETT was suctioned and a moderate amount of thick, yellowish/ cloudy secretions were obtained.     2129 - Dr. Aric Bellamy called this RN and

## 2019-06-26 NOTE — CARE COORDINATION
LSW made referrals to the following SNF facilities:  [facility]:     Does not take any [place] products. [facility]:  Spoke to Saige Singh  [phone] who reports they do not take ANTHEM. [facility]:    Left message for admissions rep, [facility]. [facility]:  Spoke with Burton Young who will entertain this referral.  Referral made for bed evaluation.     Rachel Barba [place]     Case Management   [phone]    6/26/2019  3:43 PM

## 2019-06-27 LAB
ANION GAP SERPL CALCULATED.3IONS-SCNC: 17 MMOL/L (ref 3–16)
BANDED NEUTROPHILS RELATIVE PERCENT: 11 % (ref 0–7)
BASOPHILS ABSOLUTE: 0 K/UL (ref 0–0.2)
BASOPHILS RELATIVE PERCENT: 0 %
BUN BLDV-MCNC: 45 MG/DL (ref 7–20)
CALCIUM SERPL-MCNC: 8 MG/DL (ref 8.3–10.6)
CHLORIDE BLD-SCNC: 106 MMOL/L (ref 99–110)
CO2: 30 MMOL/L (ref 21–32)
CREAT SERPL-MCNC: 1.6 MG/DL (ref 0.9–1.3)
CULTURE, RESPIRATORY: ABNORMAL
EOSINOPHILS ABSOLUTE: 0 K/UL (ref 0–0.6)
EOSINOPHILS RELATIVE PERCENT: 0 %
GFR AFRICAN AMERICAN: 56
GFR NON-AFRICAN AMERICAN: 46
GLUCOSE BLD-MCNC: 118 MG/DL (ref 70–99)
GLUCOSE BLD-MCNC: 119 MG/DL (ref 70–99)
GLUCOSE BLD-MCNC: 123 MG/DL (ref 70–99)
GRAM STAIN RESULT: ABNORMAL
HCT VFR BLD CALC: 30.1 % (ref 40.5–52.5)
HEMOGLOBIN: 10.3 G/DL (ref 13.5–17.5)
LYMPHOCYTES ABSOLUTE: 0.4 K/UL (ref 1–5.1)
LYMPHOCYTES RELATIVE PERCENT: 6 %
MAGNESIUM: 3.7 MG/DL (ref 1.8–2.4)
MCH RBC QN AUTO: 32 PG (ref 26–34)
MCHC RBC AUTO-ENTMCNC: 34 G/DL (ref 31–36)
MCV RBC AUTO: 94.1 FL (ref 80–100)
METAMYELOCYTES RELATIVE PERCENT: 1 %
MONOCYTES ABSOLUTE: 0.8 K/UL (ref 0–1.3)
MONOCYTES RELATIVE PERCENT: 11 %
NEUTROPHILS ABSOLUTE: 6.1 K/UL (ref 1.7–7.7)
NEUTROPHILS RELATIVE PERCENT: 71 %
NUCLEATED RED BLOOD CELLS: 2 /100 WBC
ORGANISM: ABNORMAL
ORGANISM: ABNORMAL
PDW BLD-RTO: 13.2 % (ref 12.4–15.4)
PERFORMED ON: ABNORMAL
PERFORMED ON: ABNORMAL
PLATELET # BLD: 236 K/UL (ref 135–450)
PLATELET SLIDE REVIEW: ADEQUATE
PMV BLD AUTO: 9.3 FL (ref 5–10.5)
POTASSIUM SERPL-SCNC: 3.2 MMOL/L (ref 3.5–5.1)
POTASSIUM SERPL-SCNC: 3.5 MMOL/L (ref 3.5–5.1)
RBC # BLD: 3.2 M/UL (ref 4.2–5.9)
RBC # BLD: NORMAL 10*6/UL
SLIDE REVIEW: ABNORMAL
SODIUM BLD-SCNC: 153 MMOL/L (ref 136–145)
TOXIC GRANULATION: PRESENT
VACUOLATED NEUTROPHILS: PRESENT
VANCOMYCIN RANDOM: 6.7 UG/ML
WBC # BLD: 7.4 K/UL (ref 4–11)

## 2019-06-27 PROCEDURE — 80048 BASIC METABOLIC PNL TOTAL CA: CPT

## 2019-06-27 PROCEDURE — 99232 SBSQ HOSP IP/OBS MODERATE 35: CPT | Performed by: INTERNAL MEDICINE

## 2019-06-27 PROCEDURE — 6360000002 HC RX W HCPCS: Performed by: NURSE PRACTITIONER

## 2019-06-27 PROCEDURE — 6360000002 HC RX W HCPCS: Performed by: INTERNAL MEDICINE

## 2019-06-27 PROCEDURE — 83735 ASSAY OF MAGNESIUM: CPT

## 2019-06-27 PROCEDURE — APPNB30 APP NON BILLABLE TIME 0-30 MINS: Performed by: NURSE PRACTITIONER

## 2019-06-27 PROCEDURE — 36415 COLL VENOUS BLD VENIPUNCTURE: CPT

## 2019-06-27 PROCEDURE — 6370000000 HC RX 637 (ALT 250 FOR IP): Performed by: INTERNAL MEDICINE

## 2019-06-27 PROCEDURE — 2580000003 HC RX 258: Performed by: INTERNAL MEDICINE

## 2019-06-27 PROCEDURE — 99232 SBSQ HOSP IP/OBS MODERATE 35: CPT | Performed by: SURGERY

## 2019-06-27 PROCEDURE — 2580000003 HC RX 258: Performed by: PSYCHIATRY & NEUROLOGY

## 2019-06-27 PROCEDURE — 2500000003 HC RX 250 WO HCPCS: Performed by: PSYCHIATRY & NEUROLOGY

## 2019-06-27 PROCEDURE — APPSS30 APP SPLIT SHARED TIME 16-30 MINUTES: Performed by: NURSE PRACTITIONER

## 2019-06-27 PROCEDURE — 6370000000 HC RX 637 (ALT 250 FOR IP): Performed by: NURSE PRACTITIONER

## 2019-06-27 PROCEDURE — 2100000000 HC CCU R&B

## 2019-06-27 PROCEDURE — 85025 COMPLETE CBC W/AUTO DIFF WBC: CPT

## 2019-06-27 PROCEDURE — 94003 VENT MGMT INPAT SUBQ DAY: CPT

## 2019-06-27 PROCEDURE — 94750 HC PULMONARY COMPLIANCE STUDY: CPT

## 2019-06-27 PROCEDURE — 84132 ASSAY OF SERUM POTASSIUM: CPT

## 2019-06-27 PROCEDURE — 2580000003 HC RX 258: Performed by: NURSE PRACTITIONER

## 2019-06-27 PROCEDURE — 80202 ASSAY OF VANCOMYCIN: CPT

## 2019-06-27 RX ORDER — POTASSIUM CHLORIDE 7.45 MG/ML
20 INJECTION INTRAVENOUS ONCE
Status: DISCONTINUED | OUTPATIENT
Start: 2019-06-27 | End: 2019-06-27 | Stop reason: ALTCHOICE

## 2019-06-27 RX ORDER — POTASSIUM CHLORIDE 7.45 MG/ML
10 INJECTION INTRAVENOUS
Status: COMPLETED | OUTPATIENT
Start: 2019-06-27 | End: 2019-06-27

## 2019-06-27 RX ADMIN — POTASSIUM CHLORIDE 10 MEQ: 7.46 INJECTION, SOLUTION INTRAVENOUS at 19:17

## 2019-06-27 RX ADMIN — FAMOTIDINE 20 MG: 20 TABLET ORAL at 08:42

## 2019-06-27 RX ADMIN — VALPROATE SODIUM 500 MG: 100 INJECTION, SOLUTION INTRAVENOUS at 12:54

## 2019-06-27 RX ADMIN — FAMOTIDINE 20 MG: 20 TABLET ORAL at 21:00

## 2019-06-27 RX ADMIN — LEVETIRACETAM 1000 MG: 10 INJECTION INTRAVENOUS at 21:20

## 2019-06-27 RX ADMIN — Medication 2 G: at 16:51

## 2019-06-27 RX ADMIN — CARVEDILOL 6.25 MG: 6.25 TABLET, FILM COATED ORAL at 16:51

## 2019-06-27 RX ADMIN — POTASSIUM CHLORIDE 10 MEQ: 7.46 INJECTION, SOLUTION INTRAVENOUS at 17:05

## 2019-06-27 RX ADMIN — ACETAMINOPHEN 650 MG: 325 TABLET ORAL at 00:23

## 2019-06-27 RX ADMIN — POTASSIUM CHLORIDE 10 MEQ: 7.46 INJECTION, SOLUTION INTRAVENOUS at 18:05

## 2019-06-27 RX ADMIN — POTASSIUM CHLORIDE 10 MEQ: 7.46 INJECTION, SOLUTION INTRAVENOUS at 11:58

## 2019-06-27 RX ADMIN — Medication 10 ML: at 08:59

## 2019-06-27 RX ADMIN — CHLORHEXIDINE GLUCONATE 0.12% ORAL RINSE 15 ML: 1.2 LIQUID ORAL at 21:00

## 2019-06-27 RX ADMIN — CALCIUM GLUCONATE 1 G: 98 INJECTION, SOLUTION INTRAVENOUS at 17:51

## 2019-06-27 RX ADMIN — TIROFIBAN 0.15 MCG/KG/MIN: 5 INJECTION, SOLUTION INTRAVENOUS at 06:26

## 2019-06-27 RX ADMIN — POTASSIUM CHLORIDE 10 MEQ: 7.46 INJECTION, SOLUTION INTRAVENOUS at 09:36

## 2019-06-27 RX ADMIN — POTASSIUM CHLORIDE 10 MEQ: 7.46 INJECTION, SOLUTION INTRAVENOUS at 10:43

## 2019-06-27 RX ADMIN — ACETAMINOPHEN 650 MG: 325 TABLET ORAL at 16:05

## 2019-06-27 RX ADMIN — VALPROATE SODIUM 500 MG: 100 INJECTION, SOLUTION INTRAVENOUS at 21:49

## 2019-06-27 RX ADMIN — CHLORHEXIDINE GLUCONATE 0.12% ORAL RINSE 15 ML: 1.2 LIQUID ORAL at 08:42

## 2019-06-27 RX ADMIN — VALPROATE SODIUM 500 MG: 100 INJECTION, SOLUTION INTRAVENOUS at 04:21

## 2019-06-27 RX ADMIN — CEFEPIME HYDROCHLORIDE 2 G: 2 INJECTION, POWDER, FOR SOLUTION INTRAVENOUS at 05:32

## 2019-06-27 RX ADMIN — CARVEDILOL 6.25 MG: 6.25 TABLET, FILM COATED ORAL at 08:42

## 2019-06-27 RX ADMIN — ASPIRIN 81 MG 81 MG: 81 TABLET ORAL at 08:42

## 2019-06-27 RX ADMIN — Medication 10 ML: at 21:26

## 2019-06-27 RX ADMIN — LEVETIRACETAM 1000 MG: 10 INJECTION INTRAVENOUS at 09:23

## 2019-06-27 RX ADMIN — POTASSIUM CHLORIDE 10 MEQ: 7.46 INJECTION, SOLUTION INTRAVENOUS at 16:05

## 2019-06-27 RX ADMIN — POTASSIUM CHLORIDE 10 MEQ: 7.46 INJECTION, SOLUTION INTRAVENOUS at 08:35

## 2019-06-27 ASSESSMENT — PULMONARY FUNCTION TESTS
PIF_VALUE: 12
PIF_VALUE: 16
PIF_VALUE: 16
PIF_VALUE: 15
PIF_VALUE: 15
PIF_VALUE: 17
PIF_VALUE: 15
PIF_VALUE: 15
PIF_VALUE: 17
PIF_VALUE: 17
PIF_VALUE: 12
PIF_VALUE: 16
PIF_VALUE: 16
PIF_VALUE: 14
PIF_VALUE: 14
PIF_VALUE: 15
PIF_VALUE: 13
PIF_VALUE: 13
PIF_VALUE: 12
PIF_VALUE: 13
PIF_VALUE: 14
PIF_VALUE: 12
PIF_VALUE: 18
PIF_VALUE: 14
PIF_VALUE: 13

## 2019-06-27 ASSESSMENT — PAIN SCALES - GENERAL
PAINLEVEL_OUTOF10: 0

## 2019-06-27 NOTE — PROGRESS NOTES
Recent Labs     06/25/19  0432 06/26/19  0430 06/27/19  0435   WBC 8.9 8.3 7.4   HGB 12.0* 11.6* 10.3*   HCT 35.9* 33.7* 30.1*   MCV 92.7 93.2 94.1    194 236     Recent Labs     06/25/19  0432 06/26/19  0430 06/27/19 0435   * 151* 153*   K 3.4* 3.6 3.2*    104 106   CO2 30 29 30   BUN 27* 41* 45*   CREATININE 1.2 1.5* 1.6*     No results for input(s): AST, ALT, ALB, BILIDIR, BILITOT, ALKPHOS in the last 72 hours. No results for input(s): LIPASE, AMYLASE in the last 72 hours. No results for input(s): PROTIME, INR in the last 72 hours. Imaging  XR CHEST PORTABLE   Final Result   1. No significant change. CT Head WO Contrast   Final Result   Findings are compatible with developing cerebral edema in keeping with a not   sick brain injury         XR CHEST PORTABLE   Final Result   Life-support appliances are redemonstrated. Lungs appear grossly clear. XR CHEST PORTABLE   Final Result   Interval improvement in pulmonary edema. CT HEAD WO CONTRAST   Final Result   No acute intracranial abnormality. XR CHEST PORTABLE   Final Result   1. Diffuse pulmonary edema. 2. Endotracheal tube tip is located 3.5 cm above the tarun. Endoscopy      ASSESSMENT AND RECOMMENDATIONS   Theresa Grey is a 51 y.o. male with PMH of  HLD who presented 6/20/2019 with cardiac arrest- Vfib. Underwent LHC with BMS. CT head showed anoxic brain injury. We have been consulted regarding PEG placement        1. ***  2. ***  3. ***    RECOMMENDATIONS:  ***    If you have any questions or need any further information, please feel free to contact us [phone]. Thank you for allowing us to participate in the care of Jamison Quiles.     Ivana VILLARREAL

## 2019-06-27 NOTE — PROGRESS NOTES
[address]  Cardiology Consult    Houston Fall  1970 June 27, 2019        CC: Intubated      Subjective:     History of Present Illness: Adelita Swartz is a 51 y.o. patient with a PMH significant for no significant medical problems no significant family history no surgery comes to the hospital with acute ST elevation myocardial infarction and cardiac arrest.  He was taken to cardiac catheterization laboratory where his left anterior descending artery was occluded he had a percutaneous core intervention done and an Impella was implanted. He was in cardiogenic shock with hypotension and hypoxemic acute respiratory failure. Events from last night were noted    Family remains somewhat confused there is a barrier and language in this communication. Impella was stopped ventilator was stopped care was withdrawn. Past Medical History:   has a past medical history of YURI (acute kidney injury) ([address] Mixed hyperlipidemia, and Normal delivery. Surgical History:   has no past surgical history on file. Social History:   reports that he has never smoked. He has never used smokeless tobacco. He reports that he does not drink alcohol or use drugs. Family History:  family history is not on file.     Home Medications:  Were reviewed and are listed in nursing record and/or below  Prior to Admission medications    Not on File        CURRENT Medications:    ceFAZolin (ANCEF) 2 g in dextrose 5 % 100 mL IVPB Q8H   carvedilol (COREG) tablet 6.25 mg BID WC   tirofiban (AGGRASTAT) 50 mcg/mL infusion Continuous   levetiracetam (KEPPRA) 1000 mg/100 mL IVPB BID   ondansetron (ZOFRAN) injection 4 mg Q6H PRN   chlorhexidine (PERIDEX) 0.12 % solution 15 mL BID   glucose (GLUTOSE) 40 % oral gel 15 g PRN   dextrose 50 % IV solution PRN   glucagon (rDNA) injection 1 mg PRN   dextrose 5 % solution PRN   aspirin chewable tablet 81 mg Daily   valproate (DEPACON) 500 mg in dextrose 5 % 100 mL IVPB Q8H   sodium chloride flush 0.9 % injection 10 mL 2 times per day   sodium chloride flush 0.9 % injection 10 mL PRN   acetaminophen (TYLENOL) tablet 650 mg Q4H PRN   magnesium hydroxide (MILK OF MAGNESIA) 400 MG/5ML suspension 30 mL Daily PRN   famotidine (PEPCID) tablet 20 mg BID       Allergies:  Patient has no known allergies. Review of Systems: SEE HPI   · Cannot be obtained      Objective:     PHYSICAL EXAM:      Vitals:    06/27/19 1200   BP: 106/82   Pulse: 97   Resp: 15   Temp: 99.4 °F (37.4 °C)   SpO2: 97%    Weight: 160 lb 15 oz (73 kg)       General Appearance:   Patient is intubated unresponsive. Head:  Normocephalic, without obvious abnormality, atraumatic. Eyes:   Pupils are not reactive. Mouth: Moist mucosa, no pharyngeal erythema. Nose: Nares normal. No drainage or sinus tenderness. Neck: Supple, symmetrical, trachea midline. No adenopathy. No tenderness/mass/nodules. No carotid bruit or elevated JVD. Lungs:   Respiratory Effort: Normal   Auscultation: Clear to auscultation bilaterally, respirations unlabored. No wheeze, rales   Chest Wall:  No tenderness or deformity. Cardiovascular:    Pulses  Palpation: normal   Ascultation: Regular rate, S1/ S2 normal. No murmur, rub, or gallop. 2+ radial and pedal pulses, symmetric  Carotid  Femoral   Abdomen and Gastrointestinal:   Soft, non-tender, bowel sounds active. Liver and Spleen  Masses   Musculoskeletal: No muscle wasting  Back  Gait   Extremities: Extremities normal, atraumatic. No cyanosis or edema. No cyanosis clubbing       Skin: Inspection and palpation performed, no rashes or lesions. Pysch:  Not tested   Neurologic:  Unresponsive.        Labs     All labs have been reviewed    Lab Results   Component Value Date    WBC 7.4 06/27/2019    RBC 3.20 06/27/2019    HGB 10.3 06/27/2019    HCT 30.1 06/27/2019    MCV 94.1 06/27/2019    RDW 13.2 06/27/2019     06/27/2019     Lab Results   Component Value Date     06/27/2019    K hesitate to contact me if you have any questions.     Kristy Milner MD, MPH    [place] [address]  Ph: [phone]  Fax: [phone]    6/27/2019 1:20 PM

## 2019-06-27 NOTE — PROGRESS NOTES
Contrast   Final Result   Findings are compatible with developing cerebral edema in keeping with a not   sick brain injury         XR CHEST PORTABLE   Final Result   Life-support appliances are redemonstrated. Lungs appear grossly clear. XR CHEST PORTABLE   Final Result   Interval improvement in pulmonary edema. CT HEAD WO CONTRAST   Final Result   No acute intracranial abnormality. XR CHEST PORTABLE   Final Result   1. Diffuse pulmonary edema. 2. Endotracheal tube tip is located 3.5 cm above the tarun.                  Assessment/Plan:    Cardiac Arrest- V Fib  - underwent LHC with BMS  - Cardiology on board  - Impella device removed after family wanted to withdraw care, BP stable off impella and not on pressors  - continue current care  - amio drip restarted, now stopped  - no further cardiac workup     STEMI  - BMS placed in LAD    Fever  -suspect central fever given anoxic brain injury  -removed central line, A line, obtained cultures  -checking U/A and CXR  -hold Abx for now    Tremors  -EEG performed and showed severe bihemispheric suppression consistent with a severe encephalopathy, typically seen in severe generalized brain injury, often with anoxic brain injury  -continue valproate and Keppra    Acute Resp Failure with Hypoxia  - continue vent  - no sedation   - coming down on FiO2  - family wants to pursue aggressive care with trach/PEG but will need to transition from Brilinta to aggrastat prior to procedure; procedure is tentatively scheduled for Monday  - GI and general surgery consulted and are coordinating timing of procedures    Acute Metabolic Encephalopathy  - likely anoxic brain injury  - monitor  - CT with above results    Metabolic/Lactic Acidosis  - continue bicarb    DVT Prophylaxis: SCD  Diet: DIET TUBE FEED CONTINUOUS/CYCLIC NPO; 1.5 Calorie with Fiber; Orogastric; Continuous; 25; 70  Code Status: Full Code    PT/OT Eval Status: NA    Dispo - ICU, poor

## 2019-06-28 ENCOUNTER — ANESTHESIA EVENT (OUTPATIENT)
Dept: OPERATING ROOM | Age: 49
DRG: 003 | End: 2019-06-28
Payer: COMMERCIAL

## 2019-06-28 LAB
BANDED NEUTROPHILS RELATIVE PERCENT: 3 % (ref 0–7)
BASOPHILS ABSOLUTE: 0 K/UL (ref 0–0.2)
BASOPHILS RELATIVE PERCENT: 0 %
EOSINOPHILS ABSOLUTE: 0 K/UL (ref 0–0.6)
EOSINOPHILS RELATIVE PERCENT: 0 %
GLUCOSE BLD-MCNC: 133 MG/DL (ref 70–99)
GLUCOSE BLD-MCNC: 137 MG/DL (ref 70–99)
GLUCOSE BLD-MCNC: 149 MG/DL (ref 70–99)
GLUCOSE BLD-MCNC: 156 MG/DL (ref 70–99)
GLUCOSE BLD-MCNC: 165 MG/DL (ref 70–99)
HCT VFR BLD CALC: 30.8 % (ref 40.5–52.5)
HEMOGLOBIN: 10.1 G/DL (ref 13.5–17.5)
LYMPHOCYTES ABSOLUTE: 1.6 K/UL (ref 1–5.1)
LYMPHOCYTES RELATIVE PERCENT: 15 %
MCH RBC QN AUTO: 30.8 PG (ref 26–34)
MCHC RBC AUTO-ENTMCNC: 32.7 G/DL (ref 31–36)
MCV RBC AUTO: 94.3 FL (ref 80–100)
METAMYELOCYTES RELATIVE PERCENT: 1 %
MONOCYTES ABSOLUTE: 0.7 K/UL (ref 0–1.3)
MONOCYTES RELATIVE PERCENT: 7 %
NEUTROPHILS ABSOLUTE: 8.1 K/UL (ref 1.7–7.7)
NEUTROPHILS RELATIVE PERCENT: 74 %
NUCLEATED RED BLOOD CELLS: 5 /100 WBC
PDW BLD-RTO: 13 % (ref 12.4–15.4)
PERFORMED ON: ABNORMAL
PLATELET # BLD: 293 K/UL (ref 135–450)
PLATELET SLIDE REVIEW: ADEQUATE
PMV BLD AUTO: 9 FL (ref 5–10.5)
RBC # BLD: 3.27 M/UL (ref 4.2–5.9)
TOXIC GRANULATION: PRESENT
VACUOLATED NEUTROPHILS: PRESENT
WBC # BLD: 10.4 K/UL (ref 4–11)

## 2019-06-28 PROCEDURE — 2500000003 HC RX 250 WO HCPCS: Performed by: PSYCHIATRY & NEUROLOGY

## 2019-06-28 PROCEDURE — 6370000000 HC RX 637 (ALT 250 FOR IP): Performed by: NURSE PRACTITIONER

## 2019-06-28 PROCEDURE — 2100000000 HC CCU R&B

## 2019-06-28 PROCEDURE — 6360000002 HC RX W HCPCS: Performed by: INTERNAL MEDICINE

## 2019-06-28 PROCEDURE — 6370000000 HC RX 637 (ALT 250 FOR IP): Performed by: INTERNAL MEDICINE

## 2019-06-28 PROCEDURE — 94750 HC PULMONARY COMPLIANCE STUDY: CPT

## 2019-06-28 PROCEDURE — APPSS30 APP SPLIT SHARED TIME 16-30 MINUTES: Performed by: NURSE PRACTITIONER

## 2019-06-28 PROCEDURE — 2580000003 HC RX 258: Performed by: INTERNAL MEDICINE

## 2019-06-28 PROCEDURE — APPNB30 APP NON BILLABLE TIME 0-30 MINS: Performed by: NURSE PRACTITIONER

## 2019-06-28 PROCEDURE — 85025 COMPLETE CBC W/AUTO DIFF WBC: CPT

## 2019-06-28 PROCEDURE — 2580000003 HC RX 258: Performed by: PSYCHIATRY & NEUROLOGY

## 2019-06-28 PROCEDURE — 99232 SBSQ HOSP IP/OBS MODERATE 35: CPT | Performed by: INTERNAL MEDICINE

## 2019-06-28 PROCEDURE — 6360000002 HC RX W HCPCS: Performed by: HOSPITALIST

## 2019-06-28 PROCEDURE — 94003 VENT MGMT INPAT SUBQ DAY: CPT

## 2019-06-28 PROCEDURE — 6360000002 HC RX W HCPCS: Performed by: NURSE PRACTITIONER

## 2019-06-28 PROCEDURE — 36415 COLL VENOUS BLD VENIPUNCTURE: CPT

## 2019-06-28 RX ORDER — SODIUM CHLORIDE 0.9 % (FLUSH) 0.9 %
10 SYRINGE (ML) INJECTION PRN
Status: CANCELLED | OUTPATIENT
Start: 2019-06-28

## 2019-06-28 RX ORDER — SODIUM CHLORIDE 0.9 % (FLUSH) 0.9 %
10 SYRINGE (ML) INJECTION EVERY 12 HOURS SCHEDULED
Status: CANCELLED | OUTPATIENT
Start: 2019-06-28

## 2019-06-28 RX ORDER — METOCLOPRAMIDE HYDROCHLORIDE 5 MG/ML
10 INJECTION INTRAMUSCULAR; INTRAVENOUS ONCE
Status: COMPLETED | OUTPATIENT
Start: 2019-06-28 | End: 2019-06-28

## 2019-06-28 RX ORDER — SODIUM CHLORIDE 9 MG/ML
INJECTION, SOLUTION INTRAVENOUS CONTINUOUS
Status: CANCELLED | OUTPATIENT
Start: 2019-06-28

## 2019-06-28 RX ADMIN — LEVETIRACETAM 1000 MG: 10 INJECTION INTRAVENOUS at 08:02

## 2019-06-28 RX ADMIN — ASPIRIN 81 MG 81 MG: 81 TABLET ORAL at 08:06

## 2019-06-28 RX ADMIN — VALPROATE SODIUM 500 MG: 100 INJECTION, SOLUTION INTRAVENOUS at 12:08

## 2019-06-28 RX ADMIN — CARVEDILOL 6.25 MG: 6.25 TABLET, FILM COATED ORAL at 08:06

## 2019-06-28 RX ADMIN — INSULIN LISPRO 1 UNITS: 100 INJECTION, SOLUTION INTRAVENOUS; SUBCUTANEOUS at 11:39

## 2019-06-28 RX ADMIN — LEVETIRACETAM 1000 MG: 10 INJECTION INTRAVENOUS at 20:47

## 2019-06-28 RX ADMIN — Medication 10 ML: at 20:45

## 2019-06-28 RX ADMIN — Medication 2 G: at 17:38

## 2019-06-28 RX ADMIN — Medication 10 ML: at 08:08

## 2019-06-28 RX ADMIN — INSULIN LISPRO 1 UNITS: 100 INJECTION, SOLUTION INTRAVENOUS; SUBCUTANEOUS at 20:51

## 2019-06-28 RX ADMIN — VALPROATE SODIUM 500 MG: 100 INJECTION, SOLUTION INTRAVENOUS at 19:32

## 2019-06-28 RX ADMIN — CHLORHEXIDINE GLUCONATE 0.12% ORAL RINSE 15 ML: 1.2 LIQUID ORAL at 08:06

## 2019-06-28 RX ADMIN — Medication 2 G: at 08:01

## 2019-06-28 RX ADMIN — CHLORHEXIDINE GLUCONATE 0.12% ORAL RINSE 15 ML: 1.2 LIQUID ORAL at 20:45

## 2019-06-28 RX ADMIN — TIROFIBAN 0.15 MCG/KG/MIN: 5 INJECTION, SOLUTION INTRAVENOUS at 01:54

## 2019-06-28 RX ADMIN — INSULIN LISPRO 1 UNITS: 100 INJECTION, SOLUTION INTRAVENOUS; SUBCUTANEOUS at 08:19

## 2019-06-28 RX ADMIN — METOCLOPRAMIDE 10 MG: 5 INJECTION, SOLUTION INTRAMUSCULAR; INTRAVENOUS at 05:07

## 2019-06-28 RX ADMIN — Medication 2 G: at 01:28

## 2019-06-28 RX ADMIN — VALPROATE SODIUM 500 MG: 100 INJECTION, SOLUTION INTRAVENOUS at 04:44

## 2019-06-28 RX ADMIN — CARVEDILOL 6.25 MG: 6.25 TABLET, FILM COATED ORAL at 17:38

## 2019-06-28 RX ADMIN — FAMOTIDINE 20 MG: 20 TABLET ORAL at 20:45

## 2019-06-28 RX ADMIN — FAMOTIDINE 20 MG: 20 TABLET ORAL at 08:06

## 2019-06-28 ASSESSMENT — PAIN SCALES - GENERAL
PAINLEVEL_OUTOF10: 0

## 2019-06-28 ASSESSMENT — PULMONARY FUNCTION TESTS
PIF_VALUE: 15
PIF_VALUE: 16
PIF_VALUE: 17
PIF_VALUE: 16
PIF_VALUE: 16
PIF_VALUE: 15
PIF_VALUE: 17
PIF_VALUE: 16
PIF_VALUE: 18
PIF_VALUE: 14
PIF_VALUE: 15
PIF_VALUE: 16
PIF_VALUE: 15
PIF_VALUE: 14
PIF_VALUE: 36
PIF_VALUE: 16
PIF_VALUE: 16
PIF_VALUE: 15
PIF_VALUE: 16
PIF_VALUE: 17
PIF_VALUE: 15
PIF_VALUE: 17
PIF_VALUE: 15

## 2019-06-28 NOTE — PROGRESS NOTES
data filed at 6/28/2019 1210  Gross per 24 hour   Intake 3477.6 ml   Output 1360 ml   Net 2117.6 ml       Physical Exam Performed:    /74   Pulse 97   Temp 98.6 °F (37 °C) (Temporal)   Resp 22   Ht 6' 2\" (1.88 m)   Wt 160 lb 15 oz (73 kg)   SpO2 100%   BMI 20.66 kg/m²     General appearance: intubated, unresponsive  HEENT: Pupils dilated, reactive to light  Neck: Supple, with full range of motion. No jugular venous distention. Trachea midline. Respiratory:  Bronchial BS BL  Cardiovascular: Regular rate and rhythm with normal S1/S2   Abdomen: Soft, non-tender, non-distended with normal bowel sounds. Musculoskeletal: No clubbing, cyanosis or edema bilaterally. Skin: Skin color, texture, turgor normal.  No rashes or lesions. Neurologic:  neg babinski bl, absent corneal reflex, pupils dilated, upward gaze  Psychiatric: intubated, unresponsive  Capillary Refill: Brisk,< 3 seconds   Peripheral Pulses: +2 palpable, equal bilaterally       Labs:   Recent Labs     06/26/19  0430 06/27/19  0435 06/28/19  0444   WBC 8.3 7.4 10.4   HGB 11.6* 10.3* 10.1*   HCT 33.7* 30.1* 30.8*    236 293     Recent Labs     06/26/19  0430 06/27/19  0435 06/27/19  1445   * 153*  --    K 3.6 3.2* 3.5    106  --    CO2 29 30  --    BUN 41* 45*  --    CREATININE 1.5* 1.6*  --    CALCIUM 9.0 8.0*  --      No results for input(s): AST, ALT, BILIDIR, BILITOT, ALKPHOS in the last 72 hours. No results for input(s): INR in the last 72 hours. No results for input(s): Shon Romo in the last 72 hours. Urinalysis:      Lab Results   Component Value Date    NITRU Negative 06/24/2019    WBCUA 13 06/24/2019    BACTERIA Rare 06/24/2019    RBCUA 7 06/24/2019    BLOODU LARGE 06/24/2019    SPECGRAV 1.020 06/24/2019    GLUCOSEU Negative 06/24/2019       Radiology:  XR CHEST PORTABLE   Final Result   1. No significant change.          CT Head WO Contrast   Final Result   Findings are compatible with developing

## 2019-06-28 NOTE — CARE COORDINATION
Wife not in the room. LSW called for wife to leave message of [facility] is starting precert.   Stephanie Gore, [place]     Case Management   [phone]    6/28/2019  3:42 PM

## 2019-06-28 NOTE — PROGRESS NOTES
condition but she leaving everything to [employer]. She also added that the pt's Mom who leaves in [place] is Planning to come see the Son, and it could take about a month to get an express [place].      0700 Hand off to JARROD Lamas.

## 2019-06-29 LAB
BANDED NEUTROPHILS RELATIVE PERCENT: 3 % (ref 0–7)
BASE EXCESS ARTERIAL: 7 MMOL/L (ref -3–3)
BASOPHILS ABSOLUTE: 0 K/UL (ref 0–0.2)
BASOPHILS RELATIVE PERCENT: 0 %
BLOOD CULTURE, ROUTINE: NORMAL
CARBOXYHEMOGLOBIN ARTERIAL: 1 % (ref 0–1.5)
CULTURE, BLOOD 2: NORMAL
EOSINOPHILS ABSOLUTE: 0.1 K/UL (ref 0–0.6)
EOSINOPHILS RELATIVE PERCENT: 1 %
GLUCOSE BLD-MCNC: 128 MG/DL (ref 70–99)
GLUCOSE BLD-MCNC: 135 MG/DL (ref 70–99)
GLUCOSE BLD-MCNC: 137 MG/DL (ref 70–99)
GLUCOSE BLD-MCNC: 142 MG/DL (ref 70–99)
GLUCOSE BLD-MCNC: 143 MG/DL (ref 70–99)
HCO3 ARTERIAL: 30.4 MMOL/L (ref 21–29)
HCT VFR BLD CALC: 26.4 % (ref 40.5–52.5)
HEMOGLOBIN, ART, EXTENDED: 8.4 G/DL (ref 13.5–17.5)
HEMOGLOBIN: 8.7 G/DL (ref 13.5–17.5)
LYMPHOCYTES ABSOLUTE: 0.9 K/UL (ref 1–5.1)
LYMPHOCYTES RELATIVE PERCENT: 7 %
MCH RBC QN AUTO: 31.1 PG (ref 26–34)
MCHC RBC AUTO-ENTMCNC: 33 G/DL (ref 31–36)
MCV RBC AUTO: 94.2 FL (ref 80–100)
METHEMOGLOBIN ARTERIAL: 0.8 %
MONOCYTES ABSOLUTE: 1.4 K/UL (ref 0–1.3)
MONOCYTES RELATIVE PERCENT: 11 %
MYELOCYTE PERCENT: 1 %
NEUTROPHILS ABSOLUTE: 10.4 K/UL (ref 1.7–7.7)
NEUTROPHILS RELATIVE PERCENT: 77 %
NUCLEATED RED BLOOD CELLS: 1 /100 WBC
O2 CONTENT ARTERIAL: 12 ML/DL
O2 SAT, ARTERIAL: 98.9 %
O2 THERAPY: ABNORMAL
PCO2 ARTERIAL: 39.1 MMHG (ref 35–45)
PDW BLD-RTO: 13 % (ref 12.4–15.4)
PERFORMED ON: ABNORMAL
PH ARTERIAL: 7.5 (ref 7.35–7.45)
PLATELET # BLD: 293 K/UL (ref 135–450)
PLATELET SLIDE REVIEW: ADEQUATE
PMV BLD AUTO: 9 FL (ref 5–10.5)
PO2 ARTERIAL: 131 MMHG (ref 75–108)
RBC # BLD: 2.81 M/UL (ref 4.2–5.9)
RBC # BLD: NORMAL 10*6/UL
SLIDE REVIEW: ABNORMAL
TCO2 ARTERIAL: 31.6 MMOL/L
TOXIC GRANULATION: PRESENT
VACUOLATED NEUTROPHILS: PRESENT
WBC # BLD: 12.8 K/UL (ref 4–11)

## 2019-06-29 PROCEDURE — 6360000002 HC RX W HCPCS: Performed by: NURSE PRACTITIONER

## 2019-06-29 PROCEDURE — 2500000003 HC RX 250 WO HCPCS: Performed by: PSYCHIATRY & NEUROLOGY

## 2019-06-29 PROCEDURE — 2100000000 HC CCU R&B

## 2019-06-29 PROCEDURE — 2580000003 HC RX 258: Performed by: PSYCHIATRY & NEUROLOGY

## 2019-06-29 PROCEDURE — 94750 HC PULMONARY COMPLIANCE STUDY: CPT

## 2019-06-29 PROCEDURE — 99232 SBSQ HOSP IP/OBS MODERATE 35: CPT | Performed by: INTERNAL MEDICINE

## 2019-06-29 PROCEDURE — 6370000000 HC RX 637 (ALT 250 FOR IP): Performed by: NURSE PRACTITIONER

## 2019-06-29 PROCEDURE — 36415 COLL VENOUS BLD VENIPUNCTURE: CPT

## 2019-06-29 PROCEDURE — 6370000000 HC RX 637 (ALT 250 FOR IP): Performed by: INTERNAL MEDICINE

## 2019-06-29 PROCEDURE — 6360000002 HC RX W HCPCS: Performed by: INTERNAL MEDICINE

## 2019-06-29 PROCEDURE — 85025 COMPLETE CBC W/AUTO DIFF WBC: CPT

## 2019-06-29 PROCEDURE — 6370000000 HC RX 637 (ALT 250 FOR IP): Performed by: HOSPITALIST

## 2019-06-29 PROCEDURE — 2580000003 HC RX 258: Performed by: INTERNAL MEDICINE

## 2019-06-29 PROCEDURE — 94003 VENT MGMT INPAT SUBQ DAY: CPT

## 2019-06-29 PROCEDURE — 82803 BLOOD GASES ANY COMBINATION: CPT

## 2019-06-29 RX ADMIN — FAMOTIDINE 20 MG: 20 TABLET ORAL at 08:41

## 2019-06-29 RX ADMIN — Medication 10 ML: at 20:38

## 2019-06-29 RX ADMIN — VALPROATE SODIUM 500 MG: 100 INJECTION, SOLUTION INTRAVENOUS at 21:02

## 2019-06-29 RX ADMIN — CHLORHEXIDINE GLUCONATE 0.12% ORAL RINSE 15 ML: 1.2 LIQUID ORAL at 08:42

## 2019-06-29 RX ADMIN — TIROFIBAN 0.15 MCG/KG/MIN: 5 INJECTION, SOLUTION INTRAVENOUS at 16:10

## 2019-06-29 RX ADMIN — FAMOTIDINE 20 MG: 20 TABLET ORAL at 20:38

## 2019-06-29 RX ADMIN — LEVETIRACETAM 1000 MG: 10 INJECTION INTRAVENOUS at 08:36

## 2019-06-29 RX ADMIN — VALPROATE SODIUM 500 MG: 100 INJECTION, SOLUTION INTRAVENOUS at 12:36

## 2019-06-29 RX ADMIN — Medication 2 G: at 00:34

## 2019-06-29 RX ADMIN — VALPROATE SODIUM 500 MG: 100 INJECTION, SOLUTION INTRAVENOUS at 04:17

## 2019-06-29 RX ADMIN — ACETAMINOPHEN 650 MG: 325 TABLET ORAL at 08:41

## 2019-06-29 RX ADMIN — LEVETIRACETAM 1000 MG: 10 INJECTION INTRAVENOUS at 20:38

## 2019-06-29 RX ADMIN — Medication 2 G: at 16:11

## 2019-06-29 RX ADMIN — INSULIN LISPRO 1 UNITS: 100 INJECTION, SOLUTION INTRAVENOUS; SUBCUTANEOUS at 08:58

## 2019-06-29 RX ADMIN — CARVEDILOL 6.25 MG: 6.25 TABLET, FILM COATED ORAL at 16:11

## 2019-06-29 RX ADMIN — INSULIN LISPRO 1 UNITS: 100 INJECTION, SOLUTION INTRAVENOUS; SUBCUTANEOUS at 04:09

## 2019-06-29 RX ADMIN — Medication 2 G: at 08:42

## 2019-06-29 RX ADMIN — Medication 10 ML: at 08:42

## 2019-06-29 RX ADMIN — CARVEDILOL 6.25 MG: 6.25 TABLET, FILM COATED ORAL at 08:41

## 2019-06-29 RX ADMIN — CHLORHEXIDINE GLUCONATE 0.12% ORAL RINSE 15 ML: 1.2 LIQUID ORAL at 20:38

## 2019-06-29 RX ADMIN — ASPIRIN 81 MG 81 MG: 81 TABLET ORAL at 08:41

## 2019-06-29 ASSESSMENT — PULMONARY FUNCTION TESTS
PIF_VALUE: 12
PIF_VALUE: 13
PIF_VALUE: 11
PIF_VALUE: 11
PIF_VALUE: 17
PIF_VALUE: 11
PIF_VALUE: 11
PIF_VALUE: 12
PIF_VALUE: 14
PIF_VALUE: 15
PIF_VALUE: 12
PIF_VALUE: 16
PIF_VALUE: 17
PIF_VALUE: 11
PIF_VALUE: 12
PIF_VALUE: 11
PIF_VALUE: 17
PIF_VALUE: 13
PIF_VALUE: 11
PIF_VALUE: 17
PIF_VALUE: 11
PIF_VALUE: 11
PIF_VALUE: 17
PIF_VALUE: 14
PIF_VALUE: 12
PIF_VALUE: 11
PIF_VALUE: 17
PIF_VALUE: 17

## 2019-06-29 ASSESSMENT — PAIN SCALES - GENERAL
PAINLEVEL_OUTOF10: 0
PAINLEVEL_OUTOF10: 0

## 2019-06-29 NOTE — PROGRESS NOTES
give an update and share the results of the EEG from yesterday, and the wife was no longer at the hospital. Will attempt again tomorrow to meet with the wife to give an update. If family continues to request aggressive care and is not interested in hospice, then LTAC/long term nursing home would likely be the next step after hospitalization. As stated in pulmonology note on 6/27, pt is breathing over the ventilator, so he would be candidate for a prolonged ventilator wean, though he will still require the placement of a trach. Trach and PEG planned for Monday.     Alma Ambrose MD

## 2019-06-29 NOTE — PROGRESS NOTES
No acute events overnight. Patient with movement to painful stimuli and also abnormal extension when suctioned or oral care performed. Wife came approx midnight and has spent the night in the patient;s room. VSS, afebrile this shift.  Shift report given to [facility]

## 2019-06-29 NOTE — PROGRESS NOTES
[address]  Cardiology Consult    Puryear Fall  1970 June 29, 2019        CC: Intubated      Subjective:     History of Present Illness: Swathi Marquez is a 51 y.o. patient with a PMH significant for no significant medical problems no significant family history no surgery comes to the hospital with acute ST elevation myocardial infarction and cardiac arrest.  He was taken to cardiac catheterization laboratory where his left anterior descending artery was occluded he had a percutaneous core intervention done and an Impella was implanted. He was in cardiogenic shock with hypotension and hypoxemic acute respiratory failure. Events from last night were noted    Family remains somewhat confused there is a barrier and language in this communication. Impella was stopped ventilator was stopped care was withdrawn. Past Medical History:   has a past medical history of YURI (acute kidney injury) ([address] Mixed hyperlipidemia, and Normal delivery. Surgical History:   has no past surgical history on file. Social History:   reports that he has never smoked. He has never used smokeless tobacco. He reports that he does not drink alcohol or use drugs. Family History:  family history is not on file.     Home Medications:  Were reviewed and are listed in nursing record and/or below  Prior to Admission medications    Not on File        CURRENT Medications:    insulin lispro (HUMALOG) injection vial 0-6 Units Q4H   ceFAZolin (ANCEF) 2 g in dextrose 5 % 100 mL IVPB Q8H   carvedilol (COREG) tablet 6.25 mg BID WC   tirofiban (AGGRASTAT) 50 mcg/mL infusion Continuous   levetiracetam (KEPPRA) 1000 mg/100 mL IVPB BID   ondansetron (ZOFRAN) injection 4 mg Q6H PRN   chlorhexidine (PERIDEX) 0.12 % solution 15 mL BID   glucose (GLUTOSE) 40 % oral gel 15 g PRN   dextrose 50 % IV solution PRN   glucagon (rDNA) injection 1 mg PRN   dextrose 5 % solution PRN   aspirin chewable tablet 81 mg Daily   valproate (DEPACON) 500 mg in dextrose 5 % 100 mL IVPB Q8H   sodium chloride flush 0.9 % injection 10 mL 2 times per day   sodium chloride flush 0.9 % injection 10 mL PRN   acetaminophen (TYLENOL) tablet 650 mg Q4H PRN   magnesium hydroxide (MILK OF MAGNESIA) 400 MG/5ML suspension 30 mL Daily PRN   famotidine (PEPCID) tablet 20 mg BID       Allergies:  Patient has no known allergies. Review of Systems: SEE HPI   · Cannot be obtained      Objective:     PHYSICAL EXAM:      Vitals:    06/29/19 0800   BP: 123/77   Pulse: 99   Resp: 15   Temp: 100.6 °F (38.1 °C)   SpO2: 100%    Weight: 159 lb 6.3 oz (72.3 kg)       General Appearance:   Patient is intubated unresponsive. Head:  Normocephalic, without obvious abnormality, atraumatic. Eyes:   Pupils are not reactive. Mouth: Moist mucosa, no pharyngeal erythema. Nose: Nares normal. No drainage or sinus tenderness. Neck: Supple, symmetrical, trachea midline. No adenopathy. No tenderness/mass/nodules. No carotid bruit or elevated JVD. Lungs:   Respiratory Effort: Normal   Auscultation: Clear to auscultation bilaterally, respirations unlabored. No wheeze, rales   Chest Wall:  No tenderness or deformity. Cardiovascular:    Pulses  Palpation: normal   Ascultation: Regular rate, S1/ S2 normal. No murmur, rub, or gallop. 2+ radial and pedal pulses, symmetric  Carotid  Femoral   Abdomen and Gastrointestinal:   Soft, non-tender, bowel sounds active. Liver and Spleen  Masses   Musculoskeletal: No muscle wasting  Back  Gait   Extremities: Extremities normal, atraumatic. No cyanosis or edema. No cyanosis clubbing       Skin: Inspection and palpation performed, no rashes or lesions. Pysch:  Not tested   Neurologic:  Unresponsive.        Labs     All labs have been reviewed    Lab Results   Component Value Date    WBC 12.8 06/29/2019    RBC 2.81 06/29/2019    HGB 8.7 06/29/2019    HCT 26.4 06/29/2019    MCV 94.2 06/29/2019    RDW 13.0 06/29/2019     06/29/2019 Lab Results   Component Value Date     06/27/2019    K 3.5 06/27/2019    K 3.1 06/20/2019     06/27/2019    CO2 30 06/27/2019    BUN 45 06/27/2019    CREATININE 1.6 06/27/2019    GFRAA 56 06/27/2019    AGRATIO 1.3 06/20/2019    LABGLOM 46 06/27/2019    GLUCOSE 119 06/27/2019    PROT 7.0 06/24/2019    CALCIUM 8.0 06/27/2019    BILITOT 0.6 06/24/2019    ALKPHOS 58 06/24/2019     06/24/2019    ALT 77 06/24/2019     No results found for: PTINR  No results found for: LABA1C  Lab Results   Component Value Date    TROPONINI <0.01 06/20/2019       Cardiac, Vascular and Imaging Data all Personally Reviewed in Detail by Myself      EKG: Sinus rhythm with marked sinus arrhythmia and Premature atrial complexesLeft axis deviationRight bundle branch blockAnteroseptal infarct (cited on or before 20-JUN-2019)** ** ACUTE MI / STEMI ** **ST elevation consider anterior injury or acute infarctAbnormal ECGWhen compared with ECG of 20-JUN-2019 19:20,Right bundle branch block is now PresentContinued ST elevation in anterior and lateral leadsConfirmed by NURY ESTRADA, Lalit Remy ([id number]) on 6/21/2019 9:25:44 AM    Echocardiogram:     Stress Test:     Cath: Other imaging:   Impression   1. Diffuse pulmonary edema. 2. Endotracheal tube tip is located 3.5 cm above the tarun. Assessment and Plan     -Patient is status post large acute anterior wall myocardial infarction. Left anterior descending artery had percutaneous intervention performed by Dr. Lexa Messina a bare-metal stent was placed after thrombectomy. Patient was in cardiogenic shock. He had cardiac arrest.  Had ventricular tachycardia. Acute respiratory failure hypoxemic on ventilator support. Hypotension is stable pressors have been weaned off. Neurological status patient remains unresponsive has encephalopathy. On beta-blocker. No response. Will be needing tracheostomy and PEG tube placement. Prognosis poor.     Thank you for allowing us

## 2019-06-30 LAB
BASOPHILS ABSOLUTE: 0 K/UL (ref 0–0.2)
BASOPHILS RELATIVE PERCENT: 0 %
EOSINOPHILS ABSOLUTE: 0 K/UL (ref 0–0.6)
EOSINOPHILS RELATIVE PERCENT: 0 %
GLUCOSE BLD-MCNC: 120 MG/DL (ref 70–99)
GLUCOSE BLD-MCNC: 133 MG/DL (ref 70–99)
GLUCOSE BLD-MCNC: 147 MG/DL (ref 70–99)
GLUCOSE BLD-MCNC: 150 MG/DL (ref 70–99)
GLUCOSE BLD-MCNC: 154 MG/DL (ref 70–99)
HCT VFR BLD CALC: 24.9 % (ref 40.5–52.5)
HEMOGLOBIN: 8.2 G/DL (ref 13.5–17.5)
LYMPHOCYTES ABSOLUTE: 1 K/UL (ref 1–5.1)
LYMPHOCYTES RELATIVE PERCENT: 8 %
MCH RBC QN AUTO: 31 PG (ref 26–34)
MCHC RBC AUTO-ENTMCNC: 33 G/DL (ref 31–36)
MCV RBC AUTO: 94 FL (ref 80–100)
METAMYELOCYTES RELATIVE PERCENT: 4 %
MONOCYTES ABSOLUTE: 1.1 K/UL (ref 0–1.3)
MONOCYTES RELATIVE PERCENT: 9 %
NEUTROPHILS ABSOLUTE: 10.5 K/UL (ref 1.7–7.7)
NEUTROPHILS RELATIVE PERCENT: 79 %
PDW BLD-RTO: 12.7 % (ref 12.4–15.4)
PERFORMED ON: ABNORMAL
PLATELET # BLD: 334 K/UL (ref 135–450)
PMV BLD AUTO: 8.5 FL (ref 5–10.5)
RBC # BLD: 2.65 M/UL (ref 4.2–5.9)
WBC # BLD: 12.7 K/UL (ref 4–11)

## 2019-06-30 PROCEDURE — 6370000000 HC RX 637 (ALT 250 FOR IP): Performed by: HOSPITALIST

## 2019-06-30 PROCEDURE — 2580000003 HC RX 258: Performed by: INTERNAL MEDICINE

## 2019-06-30 PROCEDURE — 6370000000 HC RX 637 (ALT 250 FOR IP): Performed by: INTERNAL MEDICINE

## 2019-06-30 PROCEDURE — 85025 COMPLETE CBC W/AUTO DIFF WBC: CPT

## 2019-06-30 PROCEDURE — 94003 VENT MGMT INPAT SUBQ DAY: CPT

## 2019-06-30 PROCEDURE — 99231 SBSQ HOSP IP/OBS SF/LOW 25: CPT | Performed by: INTERNAL MEDICINE

## 2019-06-30 PROCEDURE — 2580000003 HC RX 258: Performed by: PSYCHIATRY & NEUROLOGY

## 2019-06-30 PROCEDURE — 6360000002 HC RX W HCPCS: Performed by: NURSE PRACTITIONER

## 2019-06-30 PROCEDURE — 2500000003 HC RX 250 WO HCPCS: Performed by: PSYCHIATRY & NEUROLOGY

## 2019-06-30 PROCEDURE — 2100000000 HC CCU R&B

## 2019-06-30 PROCEDURE — 6360000002 HC RX W HCPCS: Performed by: INTERNAL MEDICINE

## 2019-06-30 PROCEDURE — 36415 COLL VENOUS BLD VENIPUNCTURE: CPT

## 2019-06-30 PROCEDURE — 6370000000 HC RX 637 (ALT 250 FOR IP): Performed by: NURSE PRACTITIONER

## 2019-06-30 RX ADMIN — ASPIRIN 81 MG 81 MG: 81 TABLET ORAL at 08:41

## 2019-06-30 RX ADMIN — INSULIN LISPRO 1 UNITS: 100 INJECTION, SOLUTION INTRAVENOUS; SUBCUTANEOUS at 04:31

## 2019-06-30 RX ADMIN — Medication 2 G: at 00:49

## 2019-06-30 RX ADMIN — CARVEDILOL 6.25 MG: 6.25 TABLET, FILM COATED ORAL at 08:41

## 2019-06-30 RX ADMIN — Medication 2 G: at 08:41

## 2019-06-30 RX ADMIN — CARVEDILOL 6.25 MG: 6.25 TABLET, FILM COATED ORAL at 18:38

## 2019-06-30 RX ADMIN — INSULIN LISPRO 1 UNITS: 100 INJECTION, SOLUTION INTRAVENOUS; SUBCUTANEOUS at 20:55

## 2019-06-30 RX ADMIN — VALPROATE SODIUM 500 MG: 100 INJECTION, SOLUTION INTRAVENOUS at 12:03

## 2019-06-30 RX ADMIN — TIROFIBAN 0.15 MCG/KG/MIN: 5 INJECTION, SOLUTION INTRAVENOUS at 10:28

## 2019-06-30 RX ADMIN — Medication 10 ML: at 21:43

## 2019-06-30 RX ADMIN — INSULIN LISPRO 1 UNITS: 100 INJECTION, SOLUTION INTRAVENOUS; SUBCUTANEOUS at 08:40

## 2019-06-30 RX ADMIN — CHLORHEXIDINE GLUCONATE 0.12% ORAL RINSE 15 ML: 1.2 LIQUID ORAL at 08:41

## 2019-06-30 RX ADMIN — VALPROATE SODIUM 500 MG: 100 INJECTION, SOLUTION INTRAVENOUS at 04:31

## 2019-06-30 RX ADMIN — Medication 2 G: at 18:38

## 2019-06-30 RX ADMIN — LEVETIRACETAM 1000 MG: 10 INJECTION INTRAVENOUS at 23:17

## 2019-06-30 RX ADMIN — FAMOTIDINE 20 MG: 20 TABLET ORAL at 08:41

## 2019-06-30 RX ADMIN — LEVETIRACETAM 1000 MG: 10 INJECTION INTRAVENOUS at 08:55

## 2019-06-30 RX ADMIN — Medication 10 ML: at 08:41

## 2019-06-30 RX ADMIN — FAMOTIDINE 20 MG: 20 TABLET ORAL at 20:56

## 2019-06-30 RX ADMIN — VALPROATE SODIUM 500 MG: 100 INJECTION, SOLUTION INTRAVENOUS at 19:54

## 2019-06-30 RX ADMIN — CHLORHEXIDINE GLUCONATE 0.12% ORAL RINSE 15 ML: 1.2 LIQUID ORAL at 20:56

## 2019-06-30 ASSESSMENT — PULMONARY FUNCTION TESTS
PIF_VALUE: 11
PIF_VALUE: 11
PIF_VALUE: 12
PIF_VALUE: 11
PIF_VALUE: 11
PIF_VALUE: 12
PIF_VALUE: 11
PIF_VALUE: 13
PIF_VALUE: 11

## 2019-06-30 ASSESSMENT — PAIN SCALES - GENERAL
PAINLEVEL_OUTOF10: 0

## 2019-06-30 NOTE — PROGRESS NOTES
Pulmonary Progress Note    Date of Admission: 6/20/2019   LOS: 10 days       CC:  status post arrest    Subjective:  Continues on spontaneous. ROS:    Unable to assess       PHYSICAL EXAM:    Blood pressure (!) 129/90, pulse 100, temperature 99.7 °F (37.6 °C), temperature source Oral, resp. rate 24, height 6' 2\" (1.88 m), weight 160 lb 7.9 oz (72.8 kg), SpO2 100 %.'  Gen: No distress. ENT:   Resp: No accessory muscle use. No crackles. No wheezes. No rhonchi. On vent. CV: Regular rate. Regular rhythm. No murmur or rub. No edema. Skin: Warm, dry, normal texture and turgor. No nodule on exposed extremities. M/S: No cyanosis. No clubbing. No joint deformity. Psych: poor response. No change    Medications:    Scheduled Meds:   insulin lispro  0-6 Units Subcutaneous Q4H    ceFAZolin  2 g Intravenous Q8H    carvedilol  6.25 mg Oral BID WC    levetiracetam  1,000 mg Intravenous BID    chlorhexidine  15 mL Mouth/Throat BID    aspirin  81 mg Oral Daily    valproate sodium (DEPACON) IVPB  500 mg Intravenous Q8H    sodium chloride flush  10 mL Intravenous 2 times per day    famotidine  20 mg Oral BID       Continuous Infusions:   tirofiban 0.15 mcg/kg/min (06/30/19 1028)    dextrose         PRN Meds:  ondansetron, glucose, dextrose, glucagon (rDNA), dextrose, sodium chloride flush, acetaminophen, magnesium hydroxide    Labs reviewed:  CBC:   Recent Labs     06/28/19  0444 06/29/19  0440 06/30/19  0422   WBC 10.4 12.8* 12.7*   HGB 10.1* 8.7* 8.2*   HCT 30.8* 26.4* 24.9*   MCV 94.3 94.2 94.0    293 334     BMP:   Recent Labs     06/27/19  1445   K 3.5     LIVER PROFILE: No results for input(s): AST, ALT, LIPASE, BILIDIR, BILITOT, ALKPHOS in the last 72 hours. Invalid input(s): AMYLASE,  ALB  PT/INR: No results for input(s): PROTIME, INR in the last 72 hours. APTT: No results for input(s): APTT in the last 72 hours.   UA:No results for input(s): NITRITE, COLORU, PHUR, LABCAST, [address] [facility], [address] [facility],

## 2019-06-30 NOTE — PROGRESS NOTES
No acute events overnight. Patient with stable vitals, afebrile. No significant change to neuro assessment. Flexion withdrawals to nailbed pressure on BLE, abnormal extension on BUE with nailbed pressure & oral or endotracheal suctioning. Large amounts of yellow nasal discharge continues with strong purulent odor. Guerin output >60/hr overnight. Wife arrived to bedside at midnight and remains in room at the time of this writing. Patient tolerating spontaneous settings on vent, without breath stacking or vent alarms. No other reportable findings this shift.

## 2019-06-30 NOTE — PROGRESS NOTES
Lab Results   Component Value Date     06/27/2019    K 3.5 06/27/2019    K 3.1 06/20/2019     06/27/2019    CO2 30 06/27/2019    BUN 45 06/27/2019    CREATININE 1.6 06/27/2019    GFRAA 56 06/27/2019    AGRATIO 1.3 06/20/2019    LABGLOM 46 06/27/2019    GLUCOSE 119 06/27/2019    PROT 7.0 06/24/2019    CALCIUM 8.0 06/27/2019    BILITOT 0.6 06/24/2019    ALKPHOS 58 06/24/2019     06/24/2019    ALT 77 06/24/2019     No results found for: PTINR  No results found for: LABA1C  Lab Results   Component Value Date    TROPONINI <0.01 06/20/2019       Cardiac, Vascular and Imaging Data all Personally Reviewed in Detail by Myself      EKG: Sinus rhythm with marked sinus arrhythmia and Premature atrial complexesLeft axis deviationRight bundle branch blockAnteroseptal infarct (cited on or before 20-JUN-2019)** ** ACUTE MI / STEMI ** **ST elevation consider anterior injury or acute infarctAbnormal ECGWhen compared with ECG of 20-JUN-2019 19:20,Right bundle branch block is now PresentContinued ST elevation in anterior and lateral leadsConfirmed by Irlanda ARRINGTON MD ([id number]) on 6/21/2019 9:25:44 AM    Echocardiogram:     Stress Test:     Cath: Other imaging:   Impression   1. Diffuse pulmonary edema. 2. Endotracheal tube tip is located 3.5 cm above the tarun. Assessment and Plan     -Patient is status post large acute anterior wall myocardial infarction. Left anterior descending artery had percutaneous intervention performed by Dr. Aren Chun a bare-metal stent was placed after thrombectomy. Patient was in cardiogenic shock. He had cardiac arrest.  Had ventricular tachycardia. Acute respiratory failure hypoxemic on ventilator support. Hypotension is stable pressors have been weaned off. Neurological status patient remains unresponsive has encephalopathy. On beta-blocker. No response. Will be needing tracheostomy and PEG tube placement. Prognosis poor.     Thank you for allowing us

## 2019-06-30 NOTE — PROGRESS NOTES
Net 2696.3 ml       Physical Exam Performed:    /77   Pulse 88   Temp 99.7 °F (37.6 °C) (Oral)   Resp 19   Ht 6' 2\" (1.88 m)   Wt 160 lb 7.9 oz (72.8 kg)   SpO2 100%   BMI 20.61 kg/m²     General appearance: intubated, unresponsive  HEENT: Pupils dilated, reactive to light  Neck: Supple, with full range of motion. No jugular venous distention. Trachea midline. Respiratory:  Bronchial BS BL  Cardiovascular: Regular rate and rhythm with normal S1/S2   Abdomen: Soft, non-tender, non-distended with normal bowel sounds. Musculoskeletal: No clubbing, cyanosis or edema bilaterally. Skin: Skin color, texture, turgor normal.  No rashes or lesions. Neurologic:  neg babinski bl, absent corneal reflex, pupils dilated, upward gaze  Psychiatric: intubated, unresponsive  Capillary Refill: Brisk,< 3 seconds   Peripheral Pulses: +2 palpable, equal bilaterally       Labs:   Recent Labs     06/28/19  0444 06/29/19  0440 06/30/19  0422   WBC 10.4 12.8* 12.7*   HGB 10.1* 8.7* 8.2*   HCT 30.8* 26.4* 24.9*    293 334     Recent Labs     06/27/19  1445   K 3.5     No results for input(s): AST, ALT, BILIDIR, BILITOT, ALKPHOS in the last 72 hours. No results for input(s): INR in the last 72 hours. No results for input(s): Ladi Self in the last 72 hours. Urinalysis:      Lab Results   Component Value Date    NITRU Negative 06/24/2019    WBCUA 13 06/24/2019    BACTERIA Rare 06/24/2019    RBCUA 7 06/24/2019    BLOODU LARGE 06/24/2019    SPECGRAV 1.020 06/24/2019    GLUCOSEU Negative 06/24/2019       Radiology:  XR CHEST PORTABLE   Final Result   1. No significant change. CT Head WO Contrast   Final Result   Findings are compatible with developing cerebral edema in keeping with a not   sick brain injury         XR CHEST PORTABLE   Final Result   Life-support appliances are redemonstrated. Lungs appear grossly clear.          XR CHEST PORTABLE   Final Result   Interval improvement in pulmonary

## 2019-07-01 ENCOUNTER — ANESTHESIA (OUTPATIENT)
Dept: OPERATING ROOM | Age: 49
DRG: 003 | End: 2019-07-01
Payer: COMMERCIAL

## 2019-07-01 VITALS
DIASTOLIC BLOOD PRESSURE: 87 MMHG | SYSTOLIC BLOOD PRESSURE: 114 MMHG | RESPIRATION RATE: 12 BRPM | OXYGEN SATURATION: 100 % | TEMPERATURE: 98.6 F

## 2019-07-01 LAB
ANION GAP SERPL CALCULATED.3IONS-SCNC: 13 MMOL/L (ref 3–16)
BANDED NEUTROPHILS RELATIVE PERCENT: 6 % (ref 0–7)
BASOPHILS ABSOLUTE: 0 K/UL (ref 0–0.2)
BASOPHILS RELATIVE PERCENT: 0 %
BUN BLDV-MCNC: 15 MG/DL (ref 7–20)
CALCIUM SERPL-MCNC: 8.2 MG/DL (ref 8.3–10.6)
CHLORIDE BLD-SCNC: 105 MMOL/L (ref 99–110)
CO2: 27 MMOL/L (ref 21–32)
CREAT SERPL-MCNC: 0.8 MG/DL (ref 0.9–1.3)
EOSINOPHILS ABSOLUTE: 0.1 K/UL (ref 0–0.6)
EOSINOPHILS RELATIVE PERCENT: 1 %
GFR AFRICAN AMERICAN: >60
GFR NON-AFRICAN AMERICAN: >60
GLUCOSE BLD-MCNC: 105 MG/DL (ref 70–99)
GLUCOSE BLD-MCNC: 109 MG/DL (ref 70–99)
GLUCOSE BLD-MCNC: 110 MG/DL (ref 70–99)
GLUCOSE BLD-MCNC: 92 MG/DL (ref 70–99)
GLUCOSE BLD-MCNC: 93 MG/DL (ref 70–99)
GLUCOSE BLD-MCNC: 99 MG/DL (ref 70–99)
HCT VFR BLD CALC: 26.7 % (ref 40.5–52.5)
HEMOGLOBIN: 8.9 G/DL (ref 13.5–17.5)
LYMPHOCYTES ABSOLUTE: 1.1 K/UL (ref 1–5.1)
LYMPHOCYTES RELATIVE PERCENT: 8 %
MCH RBC QN AUTO: 31.2 PG (ref 26–34)
MCHC RBC AUTO-ENTMCNC: 33.2 G/DL (ref 31–36)
MCV RBC AUTO: 93.9 FL (ref 80–100)
MONOCYTES ABSOLUTE: 0.8 K/UL (ref 0–1.3)
MONOCYTES RELATIVE PERCENT: 6 %
NEUTROPHILS ABSOLUTE: 11.4 K/UL (ref 1.7–7.7)
NEUTROPHILS RELATIVE PERCENT: 79 %
PDW BLD-RTO: 12.9 % (ref 12.4–15.4)
PERFORMED ON: ABNORMAL
PERFORMED ON: ABNORMAL
PERFORMED ON: NORMAL
PLATELET # BLD: 417 K/UL (ref 135–450)
PLATELET SLIDE REVIEW: ADEQUATE
PMV BLD AUTO: 8.5 FL (ref 5–10.5)
POTASSIUM SERPL-SCNC: 3.9 MMOL/L (ref 3.5–5.1)
RBC # BLD: 2.84 M/UL (ref 4.2–5.9)
RBC # BLD: NORMAL 10*6/UL
SLIDE REVIEW: ABNORMAL
SODIUM BLD-SCNC: 145 MMOL/L (ref 136–145)
WBC # BLD: 13.4 K/UL (ref 4–11)

## 2019-07-01 PROCEDURE — 2580000003 HC RX 258: Performed by: NURSE ANESTHETIST, CERTIFIED REGISTERED

## 2019-07-01 PROCEDURE — 36415 COLL VENOUS BLD VENIPUNCTURE: CPT

## 2019-07-01 PROCEDURE — 0DH63UZ INSERTION OF FEEDING DEVICE INTO STOMACH, PERCUTANEOUS APPROACH: ICD-10-PCS | Performed by: INTERNAL MEDICINE

## 2019-07-01 PROCEDURE — 99233 SBSQ HOSP IP/OBS HIGH 50: CPT | Performed by: INTERNAL MEDICINE

## 2019-07-01 PROCEDURE — 2500000003 HC RX 250 WO HCPCS: Performed by: NURSE ANESTHETIST, CERTIFIED REGISTERED

## 2019-07-01 PROCEDURE — 2100000000 HC CCU R&B

## 2019-07-01 PROCEDURE — 6370000000 HC RX 637 (ALT 250 FOR IP): Performed by: SURGERY

## 2019-07-01 PROCEDURE — 2580000003 HC RX 258: Performed by: PSYCHIATRY & NEUROLOGY

## 2019-07-01 PROCEDURE — 31600 PLANNED TRACHEOSTOMY: CPT | Performed by: SURGERY

## 2019-07-01 PROCEDURE — 3600000002 HC SURGERY LEVEL 2 BASE: Performed by: SURGERY

## 2019-07-01 PROCEDURE — 2500000003 HC RX 250 WO HCPCS: Performed by: SURGERY

## 2019-07-01 PROCEDURE — 2500000003 HC RX 250 WO HCPCS: Performed by: PSYCHIATRY & NEUROLOGY

## 2019-07-01 PROCEDURE — 6360000002 HC RX W HCPCS: Performed by: NURSE ANESTHETIST, CERTIFIED REGISTERED

## 2019-07-01 PROCEDURE — 99232 SBSQ HOSP IP/OBS MODERATE 35: CPT | Performed by: INTERNAL MEDICINE

## 2019-07-01 PROCEDURE — 0B110F4 BYPASS TRACHEA TO CUTANEOUS WITH TRACHEOSTOMY DEVICE, OPEN APPROACH: ICD-10-PCS | Performed by: SURGERY

## 2019-07-01 PROCEDURE — 94761 N-INVAS EAR/PLS OXIMETRY MLT: CPT

## 2019-07-01 PROCEDURE — 2709999900 HC NON-CHARGEABLE SUPPLY: Performed by: INTERNAL MEDICINE

## 2019-07-01 PROCEDURE — 0DJ08ZZ INSPECTION OF UPPER INTESTINAL TRACT, VIA NATURAL OR ARTIFICIAL OPENING ENDOSCOPIC: ICD-10-PCS | Performed by: INTERNAL MEDICINE

## 2019-07-01 PROCEDURE — 6360000002 HC RX W HCPCS: Performed by: SURGERY

## 2019-07-01 PROCEDURE — 3700000000 HC ANESTHESIA ATTENDED CARE: Performed by: SURGERY

## 2019-07-01 PROCEDURE — 80048 BASIC METABOLIC PNL TOTAL CA: CPT

## 2019-07-01 PROCEDURE — 6360000002 HC RX W HCPCS: Performed by: INTERNAL MEDICINE

## 2019-07-01 PROCEDURE — 2580000003 HC RX 258: Performed by: SURGERY

## 2019-07-01 PROCEDURE — 3600000012 HC SURGERY LEVEL 2 ADDTL 15MIN: Performed by: SURGERY

## 2019-07-01 PROCEDURE — 94003 VENT MGMT INPAT SUBQ DAY: CPT

## 2019-07-01 PROCEDURE — 2700000000 HC OXYGEN THERAPY PER DAY

## 2019-07-01 PROCEDURE — 3700000001 HC ADD 15 MINUTES (ANESTHESIA): Performed by: SURGERY

## 2019-07-01 PROCEDURE — 2709999900 HC NON-CHARGEABLE SUPPLY: Performed by: SURGERY

## 2019-07-01 PROCEDURE — 3609013300 HC EGD TUBE PLACEMENT: Performed by: INTERNAL MEDICINE

## 2019-07-01 PROCEDURE — 2720000010 HC SURG SUPPLY STERILE: Performed by: INTERNAL MEDICINE

## 2019-07-01 PROCEDURE — 85025 COMPLETE CBC W/AUTO DIFF WBC: CPT

## 2019-07-01 RX ORDER — ROCURONIUM BROMIDE 10 MG/ML
INJECTION, SOLUTION INTRAVENOUS PRN
Status: DISCONTINUED | OUTPATIENT
Start: 2019-07-01 | End: 2019-07-01 | Stop reason: SDUPTHER

## 2019-07-01 RX ORDER — FENTANYL CITRATE 50 UG/ML
INJECTION, SOLUTION INTRAMUSCULAR; INTRAVENOUS PRN
Status: DISCONTINUED | OUTPATIENT
Start: 2019-07-01 | End: 2019-07-01 | Stop reason: SDUPTHER

## 2019-07-01 RX ORDER — FENTANYL CITRATE 50 UG/ML
50 INJECTION, SOLUTION INTRAMUSCULAR; INTRAVENOUS EVERY 5 MIN PRN
Status: DISCONTINUED | OUTPATIENT
Start: 2019-07-01 | End: 2019-07-01 | Stop reason: HOSPADM

## 2019-07-01 RX ORDER — MEPERIDINE HYDROCHLORIDE 50 MG/ML
12.5 INJECTION INTRAMUSCULAR; INTRAVENOUS; SUBCUTANEOUS EVERY 5 MIN PRN
Status: DISCONTINUED | OUTPATIENT
Start: 2019-07-01 | End: 2019-07-01 | Stop reason: HOSPADM

## 2019-07-01 RX ORDER — OXYCODONE HYDROCHLORIDE 10 MG/1
10 TABLET ORAL PRN
Status: DISCONTINUED | OUTPATIENT
Start: 2019-07-01 | End: 2019-07-01 | Stop reason: HOSPADM

## 2019-07-01 RX ORDER — ONDANSETRON 2 MG/ML
4 INJECTION INTRAMUSCULAR; INTRAVENOUS
Status: DISCONTINUED | OUTPATIENT
Start: 2019-07-01 | End: 2019-07-01 | Stop reason: HOSPADM

## 2019-07-01 RX ORDER — OXYCODONE HYDROCHLORIDE 5 MG/1
5 TABLET ORAL PRN
Status: DISCONTINUED | OUTPATIENT
Start: 2019-07-01 | End: 2019-07-01 | Stop reason: HOSPADM

## 2019-07-01 RX ORDER — MORPHINE SULFATE 2 MG/ML
1 INJECTION, SOLUTION INTRAMUSCULAR; INTRAVENOUS EVERY 5 MIN PRN
Status: DISCONTINUED | OUTPATIENT
Start: 2019-07-01 | End: 2019-07-01 | Stop reason: HOSPADM

## 2019-07-01 RX ORDER — FENTANYL CITRATE 50 UG/ML
25 INJECTION, SOLUTION INTRAMUSCULAR; INTRAVENOUS EVERY 5 MIN PRN
Status: DISCONTINUED | OUTPATIENT
Start: 2019-07-01 | End: 2019-07-01 | Stop reason: HOSPADM

## 2019-07-01 RX ORDER — SODIUM CHLORIDE 9 MG/ML
INJECTION, SOLUTION INTRAVENOUS CONTINUOUS PRN
Status: DISCONTINUED | OUTPATIENT
Start: 2019-07-01 | End: 2019-07-01 | Stop reason: SDUPTHER

## 2019-07-01 RX ORDER — MORPHINE SULFATE 2 MG/ML
2 INJECTION, SOLUTION INTRAMUSCULAR; INTRAVENOUS EVERY 5 MIN PRN
Status: DISCONTINUED | OUTPATIENT
Start: 2019-07-01 | End: 2019-07-01 | Stop reason: HOSPADM

## 2019-07-01 RX ORDER — LIDOCAINE HYDROCHLORIDE 10 MG/ML
INJECTION, SOLUTION INFILTRATION; PERINEURAL
Status: COMPLETED | OUTPATIENT
Start: 2019-07-01 | End: 2019-07-01

## 2019-07-01 RX ADMIN — SODIUM CHLORIDE: 9 INJECTION, SOLUTION INTRAVENOUS at 07:48

## 2019-07-01 RX ADMIN — FAMOTIDINE 20 MG: 20 TABLET ORAL at 20:39

## 2019-07-01 RX ADMIN — Medication 2 G: at 17:44

## 2019-07-01 RX ADMIN — VALPROATE SODIUM 500 MG: 100 INJECTION, SOLUTION INTRAVENOUS at 20:44

## 2019-07-01 RX ADMIN — CARVEDILOL 6.25 MG: 6.25 TABLET, FILM COATED ORAL at 17:44

## 2019-07-01 RX ADMIN — VALPROATE SODIUM 500 MG: 100 INJECTION, SOLUTION INTRAVENOUS at 13:15

## 2019-07-01 RX ADMIN — ASPIRIN 81 MG 81 MG: 81 TABLET ORAL at 10:36

## 2019-07-01 RX ADMIN — VALPROATE SODIUM 500 MG: 100 INJECTION, SOLUTION INTRAVENOUS at 03:59

## 2019-07-01 RX ADMIN — CARVEDILOL 6.25 MG: 6.25 TABLET, FILM COATED ORAL at 10:36

## 2019-07-01 RX ADMIN — ROCURONIUM BROMIDE 50 MG: 10 INJECTION INTRAVENOUS at 07:55

## 2019-07-01 RX ADMIN — TIROFIBAN 0.15 MCG/KG/MIN: 5 INJECTION, SOLUTION INTRAVENOUS at 13:16

## 2019-07-01 RX ADMIN — Medication 10 ML: at 22:27

## 2019-07-01 RX ADMIN — FENTANYL CITRATE 50 MCG: 50 INJECTION INTRAMUSCULAR; INTRAVENOUS at 07:58

## 2019-07-01 RX ADMIN — Medication 2 G: at 00:53

## 2019-07-01 RX ADMIN — LEVETIRACETAM 1000 MG: 10 INJECTION INTRAVENOUS at 22:26

## 2019-07-01 RX ADMIN — LEVETIRACETAM 1000 MG: 10 INJECTION INTRAVENOUS at 10:37

## 2019-07-01 RX ADMIN — CHLORHEXIDINE GLUCONATE 0.12% ORAL RINSE 15 ML: 1.2 LIQUID ORAL at 20:39

## 2019-07-01 RX ADMIN — FENTANYL CITRATE 50 MCG: 50 INJECTION INTRAMUSCULAR; INTRAVENOUS at 07:51

## 2019-07-01 RX ADMIN — Medication 2 G: at 12:18

## 2019-07-01 RX ADMIN — FAMOTIDINE 20 MG: 20 TABLET ORAL at 10:36

## 2019-07-01 ASSESSMENT — PULMONARY FUNCTION TESTS
PIF_VALUE: 18
PIF_VALUE: 16
PIF_VALUE: 17
PIF_VALUE: 11
PIF_VALUE: 13
PIF_VALUE: 17
PIF_VALUE: 14
PIF_VALUE: 18
PIF_VALUE: 18
PIF_VALUE: 17
PIF_VALUE: 17
PIF_VALUE: 18
PIF_VALUE: 17
PIF_VALUE: 16
PIF_VALUE: 18
PIF_VALUE: 15
PIF_VALUE: 14
PIF_VALUE: 17
PIF_VALUE: 11
PIF_VALUE: 17
PIF_VALUE: 3
PIF_VALUE: 17
PIF_VALUE: 11
PIF_VALUE: 17
PIF_VALUE: 16
PIF_VALUE: 18
PIF_VALUE: 17
PIF_VALUE: 17
PIF_VALUE: 18
PIF_VALUE: 18
PIF_VALUE: 16
PIF_VALUE: 17
PIF_VALUE: 14
PIF_VALUE: 18
PIF_VALUE: 15
PIF_VALUE: 19
PIF_VALUE: 14
PIF_VALUE: 17
PIF_VALUE: 14
PIF_VALUE: 17
PIF_VALUE: 14
PIF_VALUE: 18
PIF_VALUE: 14
PIF_VALUE: 11
PIF_VALUE: 17
PIF_VALUE: 14
PIF_VALUE: 14
PIF_VALUE: 11
PIF_VALUE: 15
PIF_VALUE: 18
PIF_VALUE: 17
PIF_VALUE: 19
PIF_VALUE: 17
PIF_VALUE: 17

## 2019-07-01 ASSESSMENT — PAIN SCALES - GENERAL
PAINLEVEL_OUTOF10: 0

## 2019-07-01 NOTE — OP NOTE
Operative Report      Patient: Mary Alice Ward MRN: [id number]     YOB: 1970  Age: 51 y.o. Sex: male        Primary Care Physician: Pedro Kyle MD         DATE OF OPERATION: 7/1/2019     PREOPERATIVE DIAGNOSIS: anoxic encephalopathy    POSTOPERATIVE DIAGNOSIS: anoxic encephalopathy    PROCEDURE PERFORMED:  Insertion No. 8 LPC tracheostomy    SURGEON: Aris Tse MD    ANESTHESIA: GETA    ASA CLASS: 5    DVT PROPHYLAXIS: bilateral SCDs    ANTIBIOTICS: scheduled Ancef    INDICATIONS: August Coup Fall presented with cardiac arrest and was intubated. He has been on prolonged ventilation. He has ongoing anoxic brain injury with no evidence of recover. Per the pulmonary team, we were consulted for tracheostomy placement for airway protection weaning. The risks, benefits, and alternatives were explained to the family and they are willing to consent for the procedure. Procedure Details:       After informed consent was obtained, the patient was brought to the operating room and placed in the supine position. The neck was then hyperextended to facilitate exposure of his anterior neck. The  patient was then prepped and draped in the usual sterile fashion. A timeout was then performed. No additional antibiotics were given because he was on therapeutic antibiotics. A transverse incision was then made approximately 2 cm up from the sternal notch. Electrocautery was then used for hemostasis to go through the subcutaneous tissue and platysma down to the strap muscles. The strap muscles were divided down the midline in order to reflect them laterally. The tissue below the strap muscles was then divided to expose the trachea. A box-type incision was then made between the 2nd and 3rd tracheal rings and the trachea was opened. A trachea hook was then used to elevate the trachea and a No. 8 low-pressure cuff Shiley tracheostomy tube was inserted in the trachea and the balloon was inflated.  The patient was

## 2019-07-01 NOTE — H&P
H&P Update    Patient's History and Physical from June 20, 2019 was reviewed. Patient examined. There has been no improvement in neurologic function  Family wishes to proceed with trach  The risks, benefits and alternatives to the planned procedure were discussed. Wife expressed an understanding and is willing to proceed.   .    Electronically signed by Juan Epps MD on 7/1/2019 at 7:09 AM

## 2019-07-01 NOTE — H&P
in the left upper quadrant. This area was marked, prepped and draped in sterile fashion and infiltrated with 1% Lidocaine solution. A 1 cm transverse skin incision was made with #11 scalpel blade. A #14 gauge Angiocath was then passed through the skin incision puncturing the gastric lumen under endoscopic visualization. The metal trocar was removed and a guidewire was passed through the Angiocath into the gastric lumen where it was grasped with an electrocautery snare, passed through the scope. The scope snare and guidewire were then pulled back through the proximal stomach, esophagus and out through the patient's mouth, leaving the guidewire protruding from the mouth and protruding from the skin at the skin incision. A #20 Romanian percutaneous endoscopic gastrostomy tube was then attached to the guidewire at the mouth. The guidewire at the skin was grasped and pulled, pulling the gastrostomy tube and guidewire though the gastrostomy incision until the intragastric bumper was snug against the intragastric wall. A second external bumper was advance over the external portion of the gastrostomy tube and fixed at the 3 cm river at the skin. The gastrostomy incision was then dressed with iodoform ointment and a dry, sterile, gauze dressing. A Y connector was attached to the end of the gastrostomy tube. The patient tolerated the procedure well and was taken to the post anesthesia care unit in good condition. Impression:    Successful placement of a PEG tube    Recommendations:    1.  OK to resume tube feeds in 6 hours if hemodynamically stable   2. Convert medications to liquid if possible and infuse through PEG. If no liquid alternative, then crush and mix in 20-30 cc of water and infuse through PEG. 3.  Keep head of bed elevated during tube feeds. 4.  Keep outer bumper at 3 cm. 5.  Place dressing over top of PEG. Do not place dressing between outer bumper and skin.   6.  Place abdominal binder to prevent patient tampering with the PEG. 7.  Flush PEG with 30mL of water tid and before and after each use. 8.  Please call with any problems with the [phone].        Brady Turner, DO

## 2019-07-01 NOTE — PROGRESS NOTES
Pulmonary Progress Note    CC:  Follow up anoxic brain injury, respiratory failure    Subjective: Had trach and PEG placed today  No events afterwards      Intake/Output Summary (Last 24 hours) at 7/1/2019 1312  Last data filed at 7/1/2019 1044  Gross per 24 hour   Intake 2967.05 ml   Output 1655 ml   Net 1312.05 ml         PHYSICAL EXAM:  Blood pressure (!) 126/97, pulse 77, temperature 98.3 °F (36.8 °C), temperature source Temporal, resp. rate 18, height 6' 2\" (1.88 m), weight 174 lb 6.1 oz (79.1 kg), SpO2 100 %.'  Gen: Comatose   Eyes: PERRL. No sclera icterus. No conjunctival injection. ENT: No discharge. Pharynx clear. External appearance of ears and nose normal.  Neck: Trachea midline. No obvious mass. + trach  Resp: No crackles. No wheezes. No rhonchi. No dullness on percussion. CV: Regular rate. Regular rhythm. No murmur or rub. GI: Non-tender. Non-distended. No hernia. + PEG  Skin: Warm, dry, normal texture and turgor. No nodule on exposed extremities. Lymph: No cervical LAD. No supraclavicular LAD. M/S: No cyanosis. No clubbing. No joint deformity.     Neuro: no response to stimuli, flaccid  Ext:   no edema    Medications:    Scheduled Meds:   insulin lispro  0-6 Units Subcutaneous Q4H    ceFAZolin  2 g Intravenous Q8H    carvedilol  6.25 mg Oral BID WC    levetiracetam  1,000 mg Intravenous BID    chlorhexidine  15 mL Mouth/Throat BID    aspirin  81 mg Oral Daily    valproate sodium (DEPACON) IVPB  500 mg Intravenous Q8H    sodium chloride flush  10 mL Intravenous 2 times per day    famotidine  20 mg Oral BID       Continuous Infusions:   tirofiban      dextrose         PRN Meds:  ondansetron, glucose, dextrose, glucagon (rDNA), dextrose, sodium chloride flush, acetaminophen, magnesium hydroxide    Labs:  CBC:   Recent Labs     06/29/19  0440 06/30/19  0422 07/01/19  0427   WBC 12.8* 12.7* 13.4*   HGB 8.7* 8.2* 8.9*   HCT 26.4* 24.9* 26.7*   MCV 94.2 94.0 93.9    334 417

## 2019-07-02 LAB
ANION GAP SERPL CALCULATED.3IONS-SCNC: 14 MMOL/L (ref 3–16)
ANISOCYTOSIS: ABNORMAL
BANDED NEUTROPHILS RELATIVE PERCENT: 2 % (ref 0–7)
BASE EXCESS VENOUS: 3.7 MMOL/L
BASOPHILS ABSOLUTE: 0 K/UL (ref 0–0.2)
BASOPHILS RELATIVE PERCENT: 0 %
BUN BLDV-MCNC: 18 MG/DL (ref 7–20)
CALCIUM SERPL-MCNC: 8.6 MG/DL (ref 8.3–10.6)
CARBOXYHEMOGLOBIN: 2.2 %
CHLORIDE BLD-SCNC: 103 MMOL/L (ref 99–110)
CO2: 23 MMOL/L (ref 21–32)
CREAT SERPL-MCNC: 0.7 MG/DL (ref 0.9–1.3)
EOSINOPHILS ABSOLUTE: 0.1 K/UL (ref 0–0.6)
EOSINOPHILS RELATIVE PERCENT: 1 %
GFR AFRICAN AMERICAN: >60
GFR NON-AFRICAN AMERICAN: >60
GLUCOSE BLD-MCNC: 111 MG/DL (ref 70–99)
GLUCOSE BLD-MCNC: 126 MG/DL (ref 70–99)
GLUCOSE BLD-MCNC: 144 MG/DL (ref 70–99)
GLUCOSE BLD-MCNC: 146 MG/DL (ref 70–99)
GLUCOSE BLD-MCNC: 166 MG/DL (ref 70–99)
HCO3 VENOUS: 27 MMOL/L (ref 23–29)
HCT VFR BLD CALC: 27.8 % (ref 40.5–52.5)
HEMOGLOBIN: 9.1 G/DL (ref 13.5–17.5)
LYMPHOCYTES ABSOLUTE: 0.8 K/UL (ref 1–5.1)
LYMPHOCYTES RELATIVE PERCENT: 6 %
MCH RBC QN AUTO: 31.2 PG (ref 26–34)
MCHC RBC AUTO-ENTMCNC: 32.8 G/DL (ref 31–36)
MCV RBC AUTO: 95.1 FL (ref 80–100)
METAMYELOCYTES RELATIVE PERCENT: 3 %
METHEMOGLOBIN VENOUS: 0.8 %
MONOCYTES ABSOLUTE: 1.3 K/UL (ref 0–1.3)
MONOCYTES RELATIVE PERCENT: 10 %
NEUTROPHILS ABSOLUTE: 10.9 K/UL (ref 1.7–7.7)
NEUTROPHILS RELATIVE PERCENT: 78 %
O2 CONTENT, VEN: 12 ML/DL
O2 SAT, VEN: 100 %
O2 THERAPY: ABNORMAL
PCO2, VEN: 33.9 MMHG (ref 40–50)
PDW BLD-RTO: 13.1 % (ref 12.4–15.4)
PERFORMED ON: ABNORMAL
PH VENOUS: 7.51 (ref 7.35–7.45)
PLATELET # BLD: 502 K/UL (ref 135–450)
PLATELET SLIDE REVIEW: ABNORMAL
PMV BLD AUTO: 8.3 FL (ref 5–10.5)
PO2, VEN: 230 MMHG
POLYCHROMASIA: ABNORMAL
POTASSIUM SERPL-SCNC: 4.2 MMOL/L (ref 3.5–5.1)
RBC # BLD: 2.92 M/UL (ref 4.2–5.9)
SLIDE REVIEW: ABNORMAL
SODIUM BLD-SCNC: 140 MMOL/L (ref 136–145)
TCO2 CALC VENOUS: 28 MMOL/L
TOXIC GRANULATION: PRESENT
VACUOLATED NEUTROPHILS: PRESENT
WBC # BLD: 13.1 K/UL (ref 4–11)

## 2019-07-02 PROCEDURE — 80048 BASIC METABOLIC PNL TOTAL CA: CPT

## 2019-07-02 PROCEDURE — 82803 BLOOD GASES ANY COMBINATION: CPT

## 2019-07-02 PROCEDURE — APPSS30 APP SPLIT SHARED TIME 16-30 MINUTES: Performed by: PHYSICIAN ASSISTANT

## 2019-07-02 PROCEDURE — 85025 COMPLETE CBC W/AUTO DIFF WBC: CPT

## 2019-07-02 PROCEDURE — 6360000002 HC RX W HCPCS: Performed by: SURGERY

## 2019-07-02 PROCEDURE — 6370000000 HC RX 637 (ALT 250 FOR IP): Performed by: INTERNAL MEDICINE

## 2019-07-02 PROCEDURE — 6370000000 HC RX 637 (ALT 250 FOR IP): Performed by: SURGERY

## 2019-07-02 PROCEDURE — APPNB30 APP NON BILLABLE TIME 0-30 MINS: Performed by: PHYSICIAN ASSISTANT

## 2019-07-02 PROCEDURE — 6360000002 HC RX W HCPCS: Performed by: NURSE PRACTITIONER

## 2019-07-02 PROCEDURE — 2100000000 HC CCU R&B

## 2019-07-02 PROCEDURE — 2580000003 HC RX 258: Performed by: SURGERY

## 2019-07-02 PROCEDURE — 94003 VENT MGMT INPAT SUBQ DAY: CPT

## 2019-07-02 PROCEDURE — 99233 SBSQ HOSP IP/OBS HIGH 50: CPT | Performed by: INTERNAL MEDICINE

## 2019-07-02 PROCEDURE — 2500000003 HC RX 250 WO HCPCS: Performed by: SURGERY

## 2019-07-02 PROCEDURE — 36415 COLL VENOUS BLD VENIPUNCTURE: CPT

## 2019-07-02 PROCEDURE — 6360000002 HC RX W HCPCS: Performed by: INTERNAL MEDICINE

## 2019-07-02 RX ADMIN — INSULIN LISPRO 1 UNITS: 100 INJECTION, SOLUTION INTRAVENOUS; SUBCUTANEOUS at 20:38

## 2019-07-02 RX ADMIN — Medication 2 G: at 00:32

## 2019-07-02 RX ADMIN — ENOXAPARIN SODIUM 40 MG: 40 INJECTION SUBCUTANEOUS at 09:15

## 2019-07-02 RX ADMIN — ACETAMINOPHEN 650 MG: 325 TABLET ORAL at 14:05

## 2019-07-02 RX ADMIN — ASPIRIN 81 MG 81 MG: 81 TABLET ORAL at 09:15

## 2019-07-02 RX ADMIN — Medication 2 G: at 09:15

## 2019-07-02 RX ADMIN — Medication 2 G: at 17:04

## 2019-07-02 RX ADMIN — FAMOTIDINE 20 MG: 20 TABLET ORAL at 20:33

## 2019-07-02 RX ADMIN — VALPROATE SODIUM 500 MG: 100 INJECTION, SOLUTION INTRAVENOUS at 20:33

## 2019-07-02 RX ADMIN — INSULIN LISPRO 1 UNITS: 100 INJECTION, SOLUTION INTRAVENOUS; SUBCUTANEOUS at 09:26

## 2019-07-02 RX ADMIN — TIROFIBAN 0.15 MCG/KG/MIN: 5 INJECTION, SOLUTION INTRAVENOUS at 06:15

## 2019-07-02 RX ADMIN — TICAGRELOR 180 MG: 90 TABLET ORAL at 15:53

## 2019-07-02 RX ADMIN — CARVEDILOL 6.25 MG: 6.25 TABLET, FILM COATED ORAL at 09:15

## 2019-07-02 RX ADMIN — LEVETIRACETAM 1000 MG: 10 INJECTION INTRAVENOUS at 09:53

## 2019-07-02 RX ADMIN — INSULIN LISPRO 1 UNITS: 100 INJECTION, SOLUTION INTRAVENOUS; SUBCUTANEOUS at 14:05

## 2019-07-02 RX ADMIN — CHLORHEXIDINE GLUCONATE 0.12% ORAL RINSE 15 ML: 1.2 LIQUID ORAL at 09:15

## 2019-07-02 RX ADMIN — VALPROATE SODIUM 500 MG: 100 INJECTION, SOLUTION INTRAVENOUS at 11:32

## 2019-07-02 RX ADMIN — CHLORHEXIDINE GLUCONATE 0.12% ORAL RINSE 15 ML: 1.2 LIQUID ORAL at 20:33

## 2019-07-02 RX ADMIN — LEVETIRACETAM 1000 MG: 10 INJECTION INTRAVENOUS at 20:03

## 2019-07-02 RX ADMIN — FAMOTIDINE 20 MG: 20 TABLET ORAL at 09:15

## 2019-07-02 RX ADMIN — VALPROATE SODIUM 500 MG: 100 INJECTION, SOLUTION INTRAVENOUS at 04:37

## 2019-07-02 RX ADMIN — Medication 10 ML: at 20:33

## 2019-07-02 RX ADMIN — CARVEDILOL 6.25 MG: 6.25 TABLET, FILM COATED ORAL at 17:04

## 2019-07-02 ASSESSMENT — PULMONARY FUNCTION TESTS
PIF_VALUE: 8
PIF_VALUE: 11
PIF_VALUE: 14
PIF_VALUE: 11
PIF_VALUE: 13
PIF_VALUE: 11
PIF_VALUE: 12
PIF_VALUE: 11
PIF_VALUE: 13
PIF_VALUE: 11
PIF_VALUE: 13
PIF_VALUE: 11
PIF_VALUE: 13
PIF_VALUE: 11
PIF_VALUE: 11
PIF_VALUE: 10
PIF_VALUE: 11
PIF_VALUE: 13
PIF_VALUE: 13
PIF_VALUE: 11

## 2019-07-02 ASSESSMENT — PAIN SCALES - GENERAL
PAINLEVEL_OUTOF10: 0

## 2019-07-02 NOTE — PROGRESS NOTES
7/1/2019  1915 Bedside handoff completed with Danelle Rizo.   2005 Pt resting comfortably in bed. Assessment completed. Pt is not able to follow commands, but does open eyes spontaneously, and withdrawals from some painful stimuli. 2210 Pt resting in bed TF rate increased to 50 per order, not yet at goal rate of 70.  7/2/2019  0003 Pt resting comfortably in bed. Wife now back in room at bedside. Assessment completed and unchanged from previous assessment. 0215 Pt resting comfortably in bed, wife is at bedside. 0402 Pt resting in bed. Assessment completed. No changes from previous assessment. 8443 Bedside handoff completed with Danelle Rizo.

## 2019-07-02 NOTE — PROGRESS NOTES
CREATININE 0.8* 0.7*   CALCIUM 8.2* 8.6     No results for input(s): AST, ALT, BILIDIR, BILITOT, ALKPHOS in the last 72 hours. No results for input(s): INR in the last 72 hours. No results for input(s): Francisco Jest in the last 72 hours. Assessment/Plan:    Active Hospital Problems    Diagnosis Date Noted    Bronchopneumonia due to Klebsiella pneumoniae ([facility] [J15.0]     Staphylococcus aureus bronchitis [J40, B95.61]     Acute systolic heart failure (HCC) [I50.21]     Ischemic cardiomyopathy [I25.5]     STEMI involving left anterior descending coronary artery (HCC) [I21.02] 06/21/2019    Cardiac arrest ([facility] [I46.9]     ST elevation myocardial infarction (STEMI) ([facility] [I21.3]     Pulmonary edema cardiac cause (HCC) [I50.1]     Cardiogenic shock (HCC) [R57.0]     Ventricular fibrillation (HCC) [I49.01]     Anoxic encephalopathy (HCC) [G93.1]     Acute respiratory failure with hypoxia (HCC) [J96.01]     Acute pulmonary edema (HCC) [G36.2]     Metabolic acidosis [Q06.1]     Lactic acidosis [E87.2]     [facility]) [facility] [N17.9]     Transaminitis [R74.0]      V fib cardiac Arrest with STEMI. S/p cath with BMS PCI  - Cardiology following  - S/p impella and pressors; now off  - continue current care  - transitioned off amio     Staph and Klebsiella pneumonia  -continue Ancef for completion of course; last dose today     Tremors  -EEG performed and showed severe bihemispheric suppression consistent with a severe encephalopathy, typically seen in severe generalized brain injury, often with anoxic brain injury  -continue valproate and Keppra  -neuro following     Acute Resp Failure with Hypoxia.   S/p trach and PEG placement 7/1  - wean vent as tolerated  - off sedation   - TFs     Acute Metabolic Encephalopathy 2/2 anoxic brain injury  - poor prognosis  - palliative on board     Metabolic/Lactic Acidosis; resolved  - s/p bicarb     DVT Prophylaxis: SCD  Diet: DIET TUBE FEED CONTINUOUS/CYCLIC NPO; 1.5 Calorie with Fiber; Orogastric; Continuous; 25; 70  Code Status: Full Code    Dispo - Poor prognosis, palliative on board, pending ltac    Sangeeta Baca MD

## 2019-07-02 NOTE — PROGRESS NOTES
Pulmonary Progress Note    CC:  Follow up anoxic brain injury, respiratory failure    Subjective:    Trach/PEG yesterday  On AC  No issues overnight       Intake/Output Summary (Last 24 hours) at 7/2/2019 1020  Last data filed at 7/2/2019 0800  Gross per 24 hour   Intake 1129.4 ml   Output 1535 ml   Net -405.6 ml         PHYSICAL EXAM:  Blood pressure 134/85, pulse 123, temperature 99.2 °F (37.3 °C), temperature source Oral, resp. rate (!) 31, height 6' 2\" (1.88 m), weight 173 lb 4.5 oz (78.6 kg), SpO2 100 %.'  Gen: Comatose   Eyes: PERRL. No sclera icterus. No conjunctival injection. ENT: No discharge. Pharynx clear. External appearance of ears and nose normal.  Neck: Trachea midline. No obvious mass. + trach  Resp: No crackles. No wheezes. No rhonchi. No dullness on percussion. CV: Regular rate. Regular rhythm. No murmur or rub. GI: Non-tender. Non-distended. No hernia. + PEG  Skin: Warm, dry, normal texture and turgor. No nodule on exposed extremities. Lymph: No cervical LAD. No supraclavicular LAD. M/S: No cyanosis. No clubbing. No joint deformity.     Neuro: some response to stimuli, flaccid  Ext:   no edema    Medications:    Scheduled Meds:   ceFAZolin  2 g Intravenous Q8H    enoxaparin  40 mg Subcutaneous Daily    insulin lispro  0-6 Units Subcutaneous Q4H    carvedilol  6.25 mg Oral BID     levetiracetam  1,000 mg Intravenous BID    chlorhexidine  15 mL Mouth/Throat BID    aspirin  81 mg Oral Daily    valproate sodium (DEPACON) IVPB  500 mg Intravenous Q8H    sodium chloride flush  10 mL Intravenous 2 times per day    famotidine  20 mg Oral BID       Continuous Infusions:   tirofiban 0.15 mcg/kg/min (07/02/19 0615)    dextrose         PRN Meds:  ondansetron, glucose, dextrose, glucagon (rDNA), dextrose, sodium chloride flush, acetaminophen, magnesium hydroxide    Labs:  CBC:   Recent Labs     06/30/19  0422 07/01/19  0427 07/02/19 0427   WBC 12.7* 13.4* 13.1*   HGB 8.2* 8.9* 9.1*

## 2019-07-03 LAB
ANION GAP SERPL CALCULATED.3IONS-SCNC: 14 MMOL/L (ref 3–16)
BASOPHILS ABSOLUTE: 0 K/UL (ref 0–0.2)
BASOPHILS RELATIVE PERCENT: 0 %
BUN BLDV-MCNC: 16 MG/DL (ref 7–20)
CALCIUM SERPL-MCNC: 8.8 MG/DL (ref 8.3–10.6)
CHLORIDE BLD-SCNC: 106 MMOL/L (ref 99–110)
CO2: 24 MMOL/L (ref 21–32)
CREAT SERPL-MCNC: 0.7 MG/DL (ref 0.9–1.3)
EOSINOPHILS ABSOLUTE: 0 K/UL (ref 0–0.6)
EOSINOPHILS RELATIVE PERCENT: 0 %
GFR AFRICAN AMERICAN: >60
GFR NON-AFRICAN AMERICAN: >60
GLUCOSE BLD-MCNC: 117 MG/DL (ref 70–99)
GLUCOSE BLD-MCNC: 125 MG/DL (ref 70–99)
GLUCOSE BLD-MCNC: 129 MG/DL (ref 70–99)
GLUCOSE BLD-MCNC: 136 MG/DL (ref 70–99)
GLUCOSE BLD-MCNC: 137 MG/DL (ref 70–99)
GLUCOSE BLD-MCNC: 139 MG/DL (ref 70–99)
HCT VFR BLD CALC: 23.1 % (ref 40.5–52.5)
HEMATOLOGY PATH CONSULT: NORMAL
HEMATOLOGY PATH CONSULT: YES
HEMOGLOBIN: 7.8 G/DL (ref 13.5–17.5)
LYMPHOCYTES ABSOLUTE: 0.3 K/UL (ref 1–5.1)
LYMPHOCYTES RELATIVE PERCENT: 2 %
MCH RBC QN AUTO: 31.5 PG (ref 26–34)
MCHC RBC AUTO-ENTMCNC: 33.6 G/DL (ref 31–36)
MCV RBC AUTO: 93.7 FL (ref 80–100)
METAMYELOCYTES RELATIVE PERCENT: 1 %
MONOCYTES ABSOLUTE: 1.8 K/UL (ref 0–1.3)
MONOCYTES RELATIVE PERCENT: 12 %
NEUTROPHILS ABSOLUTE: 12.6 K/UL (ref 1.7–7.7)
NEUTROPHILS RELATIVE PERCENT: 85 %
PDW BLD-RTO: 12.8 % (ref 12.4–15.4)
PERFORMED ON: ABNORMAL
PLATELET # BLD: 493 K/UL (ref 135–450)
PMV BLD AUTO: 7.9 FL (ref 5–10.5)
POLYCHROMASIA: ABNORMAL
POTASSIUM SERPL-SCNC: 3.8 MMOL/L (ref 3.5–5.1)
RBC # BLD: 2.47 M/UL (ref 4.2–5.9)
SODIUM BLD-SCNC: 144 MMOL/L (ref 136–145)
WBC # BLD: 14.6 K/UL (ref 4–11)

## 2019-07-03 PROCEDURE — 2580000003 HC RX 258: Performed by: SURGERY

## 2019-07-03 PROCEDURE — 87186 SC STD MICRODIL/AGAR DIL: CPT

## 2019-07-03 PROCEDURE — 6370000000 HC RX 637 (ALT 250 FOR IP): Performed by: SURGERY

## 2019-07-03 PROCEDURE — 6370000000 HC RX 637 (ALT 250 FOR IP): Performed by: INTERNAL MEDICINE

## 2019-07-03 PROCEDURE — 94003 VENT MGMT INPAT SUBQ DAY: CPT

## 2019-07-03 PROCEDURE — 87077 CULTURE AEROBIC IDENTIFY: CPT

## 2019-07-03 PROCEDURE — 36415 COLL VENOUS BLD VENIPUNCTURE: CPT

## 2019-07-03 PROCEDURE — 6360000002 HC RX W HCPCS: Performed by: NURSE PRACTITIONER

## 2019-07-03 PROCEDURE — 94760 N-INVAS EAR/PLS OXIMETRY 1: CPT

## 2019-07-03 PROCEDURE — 87205 SMEAR GRAM STAIN: CPT

## 2019-07-03 PROCEDURE — 80048 BASIC METABOLIC PNL TOTAL CA: CPT

## 2019-07-03 PROCEDURE — 2100000000 HC CCU R&B

## 2019-07-03 PROCEDURE — 99233 SBSQ HOSP IP/OBS HIGH 50: CPT | Performed by: INTERNAL MEDICINE

## 2019-07-03 PROCEDURE — 99232 SBSQ HOSP IP/OBS MODERATE 35: CPT | Performed by: INTERNAL MEDICINE

## 2019-07-03 PROCEDURE — 94761 N-INVAS EAR/PLS OXIMETRY MLT: CPT

## 2019-07-03 PROCEDURE — 87070 CULTURE OTHR SPECIMN AEROBIC: CPT

## 2019-07-03 PROCEDURE — 6370000000 HC RX 637 (ALT 250 FOR IP): Performed by: NURSE PRACTITIONER

## 2019-07-03 PROCEDURE — 2500000003 HC RX 250 WO HCPCS: Performed by: SURGERY

## 2019-07-03 PROCEDURE — 6360000002 HC RX W HCPCS: Performed by: SURGERY

## 2019-07-03 PROCEDURE — 85025 COMPLETE CBC W/AUTO DIFF WBC: CPT

## 2019-07-03 RX ORDER — DIVALPROEX SODIUM 125 MG/1
500 CAPSULE, COATED PELLETS ORAL EVERY 8 HOURS SCHEDULED
Status: DISCONTINUED | OUTPATIENT
Start: 2019-07-03 | End: 2019-07-06 | Stop reason: HOSPADM

## 2019-07-03 RX ORDER — SCOLOPAMINE TRANSDERMAL SYSTEM 1 MG/1
1 PATCH, EXTENDED RELEASE TRANSDERMAL
Status: DISCONTINUED | OUTPATIENT
Start: 2019-07-03 | End: 2019-07-06 | Stop reason: HOSPADM

## 2019-07-03 RX ORDER — LEVETIRACETAM 100 MG/ML
1000 SOLUTION ORAL 2 TIMES DAILY
Status: DISCONTINUED | OUTPATIENT
Start: 2019-07-03 | End: 2019-07-06 | Stop reason: HOSPADM

## 2019-07-03 RX ADMIN — CHLORHEXIDINE GLUCONATE 0.12% ORAL RINSE 15 ML: 1.2 LIQUID ORAL at 08:49

## 2019-07-03 RX ADMIN — FAMOTIDINE 20 MG: 20 TABLET ORAL at 08:49

## 2019-07-03 RX ADMIN — ASPIRIN 81 MG 81 MG: 81 TABLET ORAL at 08:49

## 2019-07-03 RX ADMIN — CARVEDILOL 6.25 MG: 6.25 TABLET, FILM COATED ORAL at 16:28

## 2019-07-03 RX ADMIN — VALPROATE SODIUM 500 MG: 100 INJECTION, SOLUTION INTRAVENOUS at 04:04

## 2019-07-03 RX ADMIN — LEVETIRACETAM 1000 MG: 500 SOLUTION ORAL at 20:57

## 2019-07-03 RX ADMIN — CARVEDILOL 6.25 MG: 6.25 TABLET, FILM COATED ORAL at 08:49

## 2019-07-03 RX ADMIN — ENOXAPARIN SODIUM 40 MG: 40 INJECTION SUBCUTANEOUS at 08:49

## 2019-07-03 RX ADMIN — VALPROATE SODIUM 500 MG: 100 INJECTION, SOLUTION INTRAVENOUS at 12:28

## 2019-07-03 RX ADMIN — Medication 10 ML: at 08:50

## 2019-07-03 RX ADMIN — ACETAMINOPHEN 650 MG: 325 TABLET ORAL at 01:30

## 2019-07-03 RX ADMIN — FAMOTIDINE 20 MG: 20 TABLET ORAL at 20:57

## 2019-07-03 RX ADMIN — Medication 10 ML: at 21:00

## 2019-07-03 RX ADMIN — CHLORHEXIDINE GLUCONATE 0.12% ORAL RINSE 15 ML: 1.2 LIQUID ORAL at 20:52

## 2019-07-03 RX ADMIN — TICAGRELOR 90 MG: 90 TABLET ORAL at 20:57

## 2019-07-03 RX ADMIN — DIVALPROEX SODIUM 500 MG: 125 CAPSULE ORAL at 22:02

## 2019-07-03 RX ADMIN — LEVETIRACETAM 1000 MG: 10 INJECTION INTRAVENOUS at 08:49

## 2019-07-03 RX ADMIN — TICAGRELOR 90 MG: 90 TABLET ORAL at 08:49

## 2019-07-03 ASSESSMENT — PULMONARY FUNCTION TESTS
PIF_VALUE: 10
PIF_VALUE: 11
PIF_VALUE: 11
PIF_VALUE: 10
PIF_VALUE: 11
PIF_VALUE: 12
PIF_VALUE: 10
PIF_VALUE: 10
PIF_VALUE: 11
PIF_VALUE: 0

## 2019-07-03 ASSESSMENT — PAIN SCALES - GENERAL
PAINLEVEL_OUTOF10: 0

## 2019-07-03 NOTE — PROGRESS NOTES
Patient placed on 40% FiO2T-Piece at 1228. Patient looks comfortable no distress noted. Spo2 100%, HR 99, RR 18-20, RN aware and at bedside. Will continue to monitor and titrate FiO2 to maintain SpO2 greater than 92%.      Electronically signed by Lottie Correa RCP on 7/3/2019 at 12:34 PM

## 2019-07-03 NOTE — PROGRESS NOTES
18 16   CREATININE 0.8* 0.7* 0.7*   CALCIUM 8.2* 8.6 8.8     No results for input(s): AST, ALT, BILIDIR, BILITOT, ALKPHOS in the last 72 hours. No results for input(s): INR in the last 72 hours. No results for input(s): Nolan Christine in the last 72 hours. Assessment/Plan:    Active Hospital Problems    Diagnosis Date Noted    Bronchopneumonia due to Klebsiella pneumoniae ([facility] [J15.0]     Staphylococcus aureus bronchitis [J40, B95.61]     Acute systolic heart failure (HCC) [I50.21]     Ischemic cardiomyopathy [I25.5]     STEMI involving left anterior descending coronary artery (HCC) [I21.02] 06/21/2019    Cardiac arrest ([facility] [I46.9]     ST elevation myocardial infarction (STEMI) ([facility] [I21.3]     Pulmonary edema cardiac cause (HCC) [I50.1]     Cardiogenic shock (HCC) [R57.0]     Ventricular fibrillation (HCC) [I49.01]     Anoxic encephalopathy (HCC) [G93.1]     Acute respiratory failure with hypoxia (HCC) [J96.01]     Acute pulmonary edema (HCC) [Y76.2]     Metabolic acidosis [W34.7]     Lactic acidosis [E87.2]     [facility]URI [facility]) [facility] [N17.9]     Transaminitis [R74.0]      V fib cardiac Arrest with STEMI. S/p cath with BMS PCI  - Cardiology following  - S/p impella and pressors; now off  - continue current care  - transitioned off amio     Staph and Klebsiella pneumonia  -sp course of Ancef for completion of course; last dose today     Tremors  -EEG performed and showed severe bihemispheric suppression consistent with a severe encephalopathy, typically seen in severe generalized brain injury, often with anoxic brain injury  -continue valproate and Keppra  -neuro following     Acute Resp Failure with Hypoxia.   S/p trach and PEG placement 7/1  - wean vent as tolerated - on trach shield at 28% fio2 today  - off sedation   - TFs     Acute Metabolic Encephalopathy 2/2 anoxic brain injury  - poor prognosis  - palliative on board     Metabolic/Lactic Acidosis; resolved  - s/p

## 2019-07-03 NOTE — PROGRESS NOTES
· Feeding Route: Orogastric  · Formula: 1.5 Calorie with Fiber  · Rate (ml/hr):70 (rate adjusted for routine Nursing care (~20 hour run)    · Volume (ml/day): 1400  · Duration: Continuous  · Water Flushes: if no IVF recommend flush of 200 ml every 4 hours  · Current TF & Flush Orders Provides: 1400 ml / 2100 kcals / 90 gms pro / 1064 ml free water per day.  If recommended flush implemented, total free water would then be 2264 ml per day (1064 (TF) + 1200 (flush)  · Additional Calories: none  · Anthropometric Measures:  · Ht: 6' 2\" (188 cm)   · Current Body Wt: 174 lb (78.9 kg)  · Admission Body Wt: 188 lb (85.3 kg)  · Weight Change: loss likely a combination of limited nutrition as well as fluid shifts   · Ideal Body Wt: 190 lb (86.2 kg), % Ideal Body    · BMI Classification: BMI 18.5 - 24.9 Normal Weight    Nutrition Interventions:   Continue current Tube Feeding  Continued Inpatient Monitoring, Education Not Indicated    Nutrition Evaluation:   · Evaluation: Goal achieved   · Goals: tolerate most appropriate form of nutrition   · Monitoring: TF Intake, TF Tolerance, Wound Healing, Weight, Pertinent Labs, Nausea or Vomiting, Diarrhea, Constipation      Electronically signed by Constantino Chuahan RD, LD on 7/3/19 at 1:05 PM    Contact Number: [phone]

## 2019-07-03 NOTE — PROGRESS NOTES
0730- Report received from [facility]. Patient resting with eyes closed on ventilator, vital signs stable. Wife at bedside. 0830- Tube feed noted to have leaked onto sheets, bed linens changed, brittany/olson care done. 1000- Critical care team at bedside, updated on fevers overnight, will obtain sputum cx, transition to trach collar/T-piece. 1230- RT at bedside to transition to T-Piece, patient tolerating well. Assessment unchanged. Sputum culture obtained, sent to lab.    1400- Vital signs remain stable, no needs noted at this time. 1600- assessment unchanged from previous. Tolerating t-piece well, vital signs remain stable. 1800- No change noted. 1940- Report given to Penny Alas RN to assume care.

## 2019-07-04 LAB
ALBUMIN SERPL-MCNC: 2.7 G/DL (ref 3.4–5)
ALP BLD-CCNC: 73 U/L (ref 40–129)
ALT SERPL-CCNC: 74 U/L (ref 10–40)
ANION GAP SERPL CALCULATED.3IONS-SCNC: 12 MMOL/L (ref 3–16)
ANISOCYTOSIS: ABNORMAL
AST SERPL-CCNC: 169 U/L (ref 15–37)
BANDED NEUTROPHILS RELATIVE PERCENT: 8 % (ref 0–7)
BASOPHILS ABSOLUTE: 0.1 K/UL (ref 0–0.2)
BASOPHILS RELATIVE PERCENT: 1 %
BILIRUB SERPL-MCNC: <0.2 MG/DL (ref 0–1)
BILIRUBIN DIRECT: <0.2 MG/DL (ref 0–0.3)
BILIRUBIN, INDIRECT: ABNORMAL MG/DL (ref 0–1)
BUN BLDV-MCNC: 16 MG/DL (ref 7–20)
CALCIUM SERPL-MCNC: 8.7 MG/DL (ref 8.3–10.6)
CHLORIDE BLD-SCNC: 105 MMOL/L (ref 99–110)
CO2: 25 MMOL/L (ref 21–32)
CREAT SERPL-MCNC: 0.7 MG/DL (ref 0.9–1.3)
EOSINOPHILS ABSOLUTE: 0.1 K/UL (ref 0–0.6)
EOSINOPHILS RELATIVE PERCENT: 1 %
GFR AFRICAN AMERICAN: >60
GFR NON-AFRICAN AMERICAN: >60
GLUCOSE BLD-MCNC: 105 MG/DL (ref 70–99)
GLUCOSE BLD-MCNC: 126 MG/DL (ref 70–99)
GLUCOSE BLD-MCNC: 128 MG/DL (ref 70–99)
GLUCOSE BLD-MCNC: 131 MG/DL (ref 70–99)
GLUCOSE BLD-MCNC: 136 MG/DL (ref 70–99)
GLUCOSE BLD-MCNC: 93 MG/DL (ref 70–99)
GLUCOSE BLD-MCNC: 99 MG/DL (ref 70–99)
HCT VFR BLD CALC: 22.1 % (ref 40.5–52.5)
HCT VFR BLD CALC: 25.1 % (ref 40.5–52.5)
HEMATOLOGY PATH CONSULT: NO
HEMOGLOBIN: 7.4 G/DL (ref 13.5–17.5)
HEMOGLOBIN: 8.3 G/DL (ref 13.5–17.5)
LYMPHOCYTES ABSOLUTE: 1.3 K/UL (ref 1–5.1)
LYMPHOCYTES RELATIVE PERCENT: 9 %
MCH RBC QN AUTO: 31.4 PG (ref 26–34)
MCHC RBC AUTO-ENTMCNC: 33.5 G/DL (ref 31–36)
MCV RBC AUTO: 93.8 FL (ref 80–100)
MONOCYTES ABSOLUTE: 1.9 K/UL (ref 0–1.3)
MONOCYTES RELATIVE PERCENT: 13 %
MYELOCYTE PERCENT: 2 %
NEUTROPHILS ABSOLUTE: 10.9 K/UL (ref 1.7–7.7)
NEUTROPHILS RELATIVE PERCENT: 66 %
PDW BLD-RTO: 12.9 % (ref 12.4–15.4)
PERFORMED ON: ABNORMAL
PERFORMED ON: NORMAL
PERFORMED ON: NORMAL
PLATELET # BLD: 576 K/UL (ref 135–450)
PLATELET SLIDE REVIEW: ABNORMAL
PMV BLD AUTO: 7.7 FL (ref 5–10.5)
POLYCHROMASIA: ABNORMAL
POTASSIUM SERPL-SCNC: 4 MMOL/L (ref 3.5–5.1)
RBC # BLD: 2.36 M/UL (ref 4.2–5.9)
SLIDE REVIEW: ABNORMAL
SODIUM BLD-SCNC: 142 MMOL/L (ref 136–145)
TOTAL PROTEIN: 7.1 G/DL (ref 6.4–8.2)
TOXIC GRANULATION: PRESENT
VACUOLATED NEUTROPHILS: PRESENT
WBC # BLD: 14.4 K/UL (ref 4–11)

## 2019-07-04 PROCEDURE — 6370000000 HC RX 637 (ALT 250 FOR IP): Performed by: SURGERY

## 2019-07-04 PROCEDURE — 2100000000 HC CCU R&B

## 2019-07-04 PROCEDURE — 80076 HEPATIC FUNCTION PANEL: CPT

## 2019-07-04 PROCEDURE — 6370000000 HC RX 637 (ALT 250 FOR IP): Performed by: INTERNAL MEDICINE

## 2019-07-04 PROCEDURE — 94761 N-INVAS EAR/PLS OXIMETRY MLT: CPT

## 2019-07-04 PROCEDURE — 6360000002 HC RX W HCPCS: Performed by: NURSE PRACTITIONER

## 2019-07-04 PROCEDURE — 87070 CULTURE OTHR SPECIMN AEROBIC: CPT

## 2019-07-04 PROCEDURE — 2700000000 HC OXYGEN THERAPY PER DAY

## 2019-07-04 PROCEDURE — 85014 HEMATOCRIT: CPT

## 2019-07-04 PROCEDURE — 36415 COLL VENOUS BLD VENIPUNCTURE: CPT

## 2019-07-04 PROCEDURE — 85025 COMPLETE CBC W/AUTO DIFF WBC: CPT

## 2019-07-04 PROCEDURE — 2580000003 HC RX 258: Performed by: SURGERY

## 2019-07-04 PROCEDURE — 99232 SBSQ HOSP IP/OBS MODERATE 35: CPT | Performed by: INTERNAL MEDICINE

## 2019-07-04 PROCEDURE — 80048 BASIC METABOLIC PNL TOTAL CA: CPT

## 2019-07-04 PROCEDURE — 85018 HEMOGLOBIN: CPT

## 2019-07-04 RX ADMIN — DIVALPROEX SODIUM 500 MG: 125 CAPSULE ORAL at 23:23

## 2019-07-04 RX ADMIN — ENOXAPARIN SODIUM 40 MG: 40 INJECTION SUBCUTANEOUS at 08:20

## 2019-07-04 RX ADMIN — FAMOTIDINE 20 MG: 20 TABLET ORAL at 20:46

## 2019-07-04 RX ADMIN — CARVEDILOL 6.25 MG: 6.25 TABLET, FILM COATED ORAL at 17:52

## 2019-07-04 RX ADMIN — LEVETIRACETAM 1000 MG: 500 SOLUTION ORAL at 08:20

## 2019-07-04 RX ADMIN — ASPIRIN 81 MG 81 MG: 81 TABLET ORAL at 08:20

## 2019-07-04 RX ADMIN — DIVALPROEX SODIUM 500 MG: 125 CAPSULE ORAL at 13:14

## 2019-07-04 RX ADMIN — FAMOTIDINE 20 MG: 20 TABLET ORAL at 08:20

## 2019-07-04 RX ADMIN — DIVALPROEX SODIUM 500 MG: 125 CAPSULE ORAL at 06:42

## 2019-07-04 RX ADMIN — Medication 10 ML: at 20:46

## 2019-07-04 RX ADMIN — CHLORHEXIDINE GLUCONATE 0.12% ORAL RINSE 15 ML: 1.2 LIQUID ORAL at 08:20

## 2019-07-04 RX ADMIN — LEVETIRACETAM 1000 MG: 500 SOLUTION ORAL at 20:46

## 2019-07-04 RX ADMIN — ACETAMINOPHEN 650 MG: 325 TABLET ORAL at 12:20

## 2019-07-04 RX ADMIN — TICAGRELOR 90 MG: 90 TABLET ORAL at 08:20

## 2019-07-04 RX ADMIN — TICAGRELOR 90 MG: 90 TABLET ORAL at 20:46

## 2019-07-04 RX ADMIN — Medication 10 ML: at 08:21

## 2019-07-04 RX ADMIN — IBUPROFEN 400 MG: 200 SUSPENSION ORAL at 13:13

## 2019-07-04 RX ADMIN — ACETAMINOPHEN 650 MG: 325 TABLET ORAL at 08:20

## 2019-07-04 RX ADMIN — CARVEDILOL 6.25 MG: 6.25 TABLET, FILM COATED ORAL at 08:20

## 2019-07-04 RX ADMIN — CHLORHEXIDINE GLUCONATE 0.12% ORAL RINSE 15 ML: 1.2 LIQUID ORAL at 20:46

## 2019-07-04 ASSESSMENT — PAIN SCALES - GENERAL
PAINLEVEL_OUTOF10: 0

## 2019-07-04 NOTE — PROGRESS NOTES
No acute events overnight. VSS, afebrile. Purulent foul smelling discharge from sinuses. Bloody secretions & small clots suctioned from Trach. No other reportable findings overnight.

## 2019-07-05 LAB
APPEARANCE BAL (LAVAGE): ABNORMAL
BASOPHILS ABSOLUTE: 0.1 K/UL (ref 0–0.2)
BASOPHILS RELATIVE PERCENT: 0.3 %
CLOT EVALUATION BAL: ABNORMAL
COLOR LAVAGE: ABNORMAL
CULTURE, RESPIRATORY: ABNORMAL
CULTURE, RESPIRATORY: ABNORMAL
EOSINOPHILS ABSOLUTE: 0.2 K/UL (ref 0–0.6)
EOSINOPHILS RELATIVE PERCENT: 1.1 %
GLUCOSE BLD-MCNC: 106 MG/DL (ref 70–99)
GLUCOSE BLD-MCNC: 110 MG/DL (ref 70–99)
GLUCOSE BLD-MCNC: 122 MG/DL (ref 70–99)
GLUCOSE BLD-MCNC: 87 MG/DL (ref 70–99)
GLUCOSE BLD-MCNC: 88 MG/DL (ref 70–99)
GLUCOSE BLD-MCNC: 96 MG/DL (ref 70–99)
GRAM STAIN RESULT: ABNORMAL
HCT VFR BLD CALC: 21.1 % (ref 40.5–52.5)
HEMATOLOGY PATH CONSULT: NO
HEMOGLOBIN: 7 G/DL (ref 13.5–17.5)
LYMPHOCYTES ABSOLUTE: 0.9 K/UL (ref 1–5.1)
LYMPHOCYTES RELATIVE PERCENT: 5 %
LYMPHOCYTES, BAL: 19 % (ref 5–10)
MACROPHAGES, BAL: 9 % (ref 90–95)
MCH RBC QN AUTO: 31 PG (ref 26–34)
MCHC RBC AUTO-ENTMCNC: 33.1 G/DL (ref 31–36)
MCV RBC AUTO: 93.6 FL (ref 80–100)
MONOCYTES ABSOLUTE: 3.6 K/UL (ref 0–1.3)
MONOCYTES RELATIVE PERCENT: 19.5 %
NEUTROPHILS ABSOLUTE: 13.8 K/UL (ref 1.7–7.7)
NEUTROPHILS RELATIVE PERCENT: 74.1 %
NUMBER OF CELLS COUNTED BAL (LAVAGE): 200
ORGANISM: ABNORMAL
PDW BLD-RTO: 12.9 % (ref 12.4–15.4)
PERFORMED ON: ABNORMAL
PERFORMED ON: NORMAL
PLATELET # BLD: 766 K/UL (ref 135–450)
PMV BLD AUTO: 8.4 FL (ref 5–10.5)
RBC # BLD: 2.25 M/UL (ref 4.2–5.9)
RBC, BAL: 94 /CUMM
SEGMENTED NEUTROPHILS, BAL: 72 % (ref 5–10)
VOLUME LAVAGE: 10 ML
WBC # BLD: 18.6 K/UL (ref 4–11)
WBC/EPI CELLS BAL: 1273 /CUMM

## 2019-07-05 PROCEDURE — 6370000000 HC RX 637 (ALT 250 FOR IP): Performed by: SURGERY

## 2019-07-05 PROCEDURE — 6370000000 HC RX 637 (ALT 250 FOR IP): Performed by: INTERNAL MEDICINE

## 2019-07-05 PROCEDURE — 2580000003 HC RX 258: Performed by: SURGERY

## 2019-07-05 PROCEDURE — 87186 SC STD MICRODIL/AGAR DIL: CPT

## 2019-07-05 PROCEDURE — 2100000000 HC CCU R&B

## 2019-07-05 PROCEDURE — 87205 SMEAR GRAM STAIN: CPT

## 2019-07-05 PROCEDURE — 99232 SBSQ HOSP IP/OBS MODERATE 35: CPT | Performed by: NURSE PRACTITIONER

## 2019-07-05 PROCEDURE — 99024 POSTOP FOLLOW-UP VISIT: CPT | Performed by: SURGERY

## 2019-07-05 PROCEDURE — 2700000000 HC OXYGEN THERAPY PER DAY

## 2019-07-05 PROCEDURE — 84145 PROCALCITONIN (PCT): CPT

## 2019-07-05 PROCEDURE — 94761 N-INVAS EAR/PLS OXIMETRY MLT: CPT

## 2019-07-05 PROCEDURE — 89051 BODY FLUID CELL COUNT: CPT

## 2019-07-05 PROCEDURE — 2500000003 HC RX 250 WO HCPCS: Performed by: INTERNAL MEDICINE

## 2019-07-05 PROCEDURE — 36415 COLL VENOUS BLD VENIPUNCTURE: CPT

## 2019-07-05 PROCEDURE — 87077 CULTURE AEROBIC IDENTIFY: CPT

## 2019-07-05 PROCEDURE — 87070 CULTURE OTHR SPECIMN AEROBIC: CPT

## 2019-07-05 PROCEDURE — 31622 DX BRONCHOSCOPE/WASH: CPT

## 2019-07-05 PROCEDURE — 6360000002 HC RX W HCPCS: Performed by: INTERNAL MEDICINE

## 2019-07-05 PROCEDURE — 6360000002 HC RX W HCPCS: Performed by: NURSE PRACTITIONER

## 2019-07-05 PROCEDURE — 85025 COMPLETE CBC W/AUTO DIFF WBC: CPT

## 2019-07-05 PROCEDURE — 99232 SBSQ HOSP IP/OBS MODERATE 35: CPT | Performed by: INTERNAL MEDICINE

## 2019-07-05 PROCEDURE — 0B9F8ZX DRAINAGE OF RIGHT LOWER LUNG LOBE, VIA NATURAL OR ARTIFICIAL OPENING ENDOSCOPIC, DIAGNOSTIC: ICD-10-PCS | Performed by: INTERNAL MEDICINE

## 2019-07-05 RX ORDER — FENTANYL CITRATE 50 UG/ML
50 INJECTION, SOLUTION INTRAMUSCULAR; INTRAVENOUS ONCE
Status: COMPLETED | OUTPATIENT
Start: 2019-07-05 | End: 2019-07-05

## 2019-07-05 RX ORDER — MIDAZOLAM HYDROCHLORIDE 1 MG/ML
INJECTION INTRAMUSCULAR; INTRAVENOUS
Status: DISPENSED
Start: 2019-07-05 | End: 2019-07-05

## 2019-07-05 RX ORDER — LIDOCAINE HYDROCHLORIDE 10 MG/ML
4 INJECTION, SOLUTION INFILTRATION; PERINEURAL ONCE
Status: COMPLETED | OUTPATIENT
Start: 2019-07-05 | End: 2019-07-05

## 2019-07-05 RX ORDER — LIDOCAINE HYDROCHLORIDE 10 MG/ML
INJECTION, SOLUTION INFILTRATION; PERINEURAL
Status: DISPENSED
Start: 2019-07-05 | End: 2019-07-05

## 2019-07-05 RX ORDER — MIDAZOLAM HYDROCHLORIDE 1 MG/ML
2 INJECTION INTRAMUSCULAR; INTRAVENOUS ONCE
Status: COMPLETED | OUTPATIENT
Start: 2019-07-05 | End: 2019-07-05

## 2019-07-05 RX ORDER — LEVOFLOXACIN 5 MG/ML
500 INJECTION, SOLUTION INTRAVENOUS EVERY 24 HOURS
Status: DISCONTINUED | OUTPATIENT
Start: 2019-07-05 | End: 2019-07-06 | Stop reason: HOSPADM

## 2019-07-05 RX ORDER — PEDIATRIC MULTIPLE VITAMIN LIQ
15 LIQUID ORAL DAILY
Status: DISCONTINUED | OUTPATIENT
Start: 2019-07-05 | End: 2019-07-06 | Stop reason: HOSPADM

## 2019-07-05 RX ORDER — FENTANYL CITRATE 50 UG/ML
INJECTION, SOLUTION INTRAMUSCULAR; INTRAVENOUS
Status: DISPENSED
Start: 2019-07-05 | End: 2019-07-05

## 2019-07-05 RX ADMIN — DIVALPROEX SODIUM 500 MG: 125 CAPSULE ORAL at 20:51

## 2019-07-05 RX ADMIN — TICAGRELOR 90 MG: 90 TABLET ORAL at 09:43

## 2019-07-05 RX ADMIN — FENTANYL CITRATE 50 MCG: 50 INJECTION INTRAMUSCULAR; INTRAVENOUS at 09:58

## 2019-07-05 RX ADMIN — ENOXAPARIN SODIUM 40 MG: 40 INJECTION SUBCUTANEOUS at 09:43

## 2019-07-05 RX ADMIN — DIVALPROEX SODIUM 500 MG: 125 CAPSULE ORAL at 05:34

## 2019-07-05 RX ADMIN — LIDOCAINE HYDROCHLORIDE 4 ML: 10 INJECTION, SOLUTION INFILTRATION; PERINEURAL at 10:18

## 2019-07-05 RX ADMIN — Medication 10 ML: at 20:51

## 2019-07-05 RX ADMIN — ASPIRIN 81 MG 81 MG: 81 TABLET ORAL at 09:43

## 2019-07-05 RX ADMIN — LEVOFLOXACIN 500 MG: 5 INJECTION, SOLUTION INTRAVENOUS at 13:33

## 2019-07-05 RX ADMIN — TICAGRELOR 90 MG: 90 TABLET ORAL at 20:51

## 2019-07-05 RX ADMIN — CHLORHEXIDINE GLUCONATE 0.12% ORAL RINSE 15 ML: 1.2 LIQUID ORAL at 20:50

## 2019-07-05 RX ADMIN — FAMOTIDINE 20 MG: 20 TABLET ORAL at 20:50

## 2019-07-05 RX ADMIN — LEVETIRACETAM 1000 MG: 500 SOLUTION ORAL at 09:01

## 2019-07-05 RX ADMIN — FAMOTIDINE 20 MG: 20 TABLET ORAL at 09:02

## 2019-07-05 RX ADMIN — LEVETIRACETAM 1000 MG: 500 SOLUTION ORAL at 20:51

## 2019-07-05 RX ADMIN — MIDAZOLAM 2 MG: 1 INJECTION INTRAMUSCULAR; INTRAVENOUS at 10:58

## 2019-07-05 RX ADMIN — CARVEDILOL 6.25 MG: 6.25 TABLET, FILM COATED ORAL at 09:02

## 2019-07-05 RX ADMIN — Medication 10 ML: at 08:54

## 2019-07-05 RX ADMIN — CHLORHEXIDINE GLUCONATE 0.12% ORAL RINSE 15 ML: 1.2 LIQUID ORAL at 09:02

## 2019-07-05 RX ADMIN — Medication 15 ML: at 13:33

## 2019-07-05 RX ADMIN — DIVALPROEX SODIUM 500 MG: 125 CAPSULE ORAL at 13:33

## 2019-07-05 RX ADMIN — CARVEDILOL 6.25 MG: 6.25 TABLET, FILM COATED ORAL at 17:07

## 2019-07-05 ASSESSMENT — PAIN SCALES - GENERAL
PAINLEVEL_OUTOF10: 0

## 2019-07-05 NOTE — PROCEDURES
[address] [place] [address] 16                          ELECTROENCEPHALOGRAM REPORT    PATIENT NAME: Prosper Freeman                    :        1970  MED REC NO:   [id number]                          ROOM:       6422  ACCOUNT NO:   [de-identified]                           ADMIT DATE: 2019  PROVIDER:     Jena Bailey DO    DATE OF EE2019    REASON FOR STUDY:  Seizures. BRIEF HISTORY AND NEUROLOGIC FINDINGS:  The patient is a 36-year-old  male being evaluated for seizures. He has a recent diagnosis of anoxic  brain injury due to cardiac arrest.    MEDICATIONS:  The patient's centrally-acting medications include Keppra  and Depacon. He reportedly is on no sedation. EEG FINDINGS:  This is a 20-channel digital EEG performed utilizing  bipolar and referential montages. The patient was unresponsive  throughout the entire recording. The background consisted of a very  low-amplitude mixture of complete bihemispheric suppression with  occasional 0.5-Hz delta activity which at times appears to be consistent  with possible sweat artifact. Other artifacts were seen including  electrical artifact and occasional eye blink artifact. In the left  hemisphere, there was a fairly consistent background of low-amplitude 5  to 6-Hz theta activity though this is not seen elsewhere during the  recording, suggesting that his may have been artifactual as well. Again, there was no reactivity of the study to any external stimuli. The patient was noted to be shaking at one point. However, there was no  clear electrographic epileptiform correlate with this. Photic stimulation was performed at various flash frequencies and did  not evoke any background response. Hyperventilation exercise was not  performed due to the patient's clinical history.     A 1-channel EKG rhythm strip was reviewed and showed significant  tachycardia
present for the duration of the procedure and procedure was performed by me under his direct supervision    ER[facility]
the  LVEDP of 40 mmHg. The left ventriculography was done after the intervention. Before the  left ventriculography, a JL4 guiding catheter was used to engage the  left main coronary artery and heparin was given per protocol. Aggrastat  was given per protocol. A BMW guidewire was advanced beyond the  99%-plus thrombus and stenosis of the ostial proximal LAD. I placed a  Penumbra CAT RX into the proximal LAD, made several passes, and a clot  was apparently extracted. Subsequent angiography showed nearly AFSANEH 3  flow in the LAD. I placed a 3.5 x 15 Vision bare metal stent at the  ostium of the LAD and there was nearly 0% residual stenosis with no  dissection. I concluded the coronary intervention procedure. After, I placed the pigtail catheter in the left ventricle. The patient  had an LVEDP of 40 mmHg. He had been frothing with pulmonary edema and  his EF was about 25%. The apical wall and the anterolateral wall and  the anterior wall were all severely hypokinetic. Because of these  findings and the pulmonary edema, it was decided that the patient  required the insertion of a short-term external heart assist system into  the heart via the right femoral percutaneous approach. This was done so  as to provide assistance with cardiac output using the Impeller pump  continuously. The Impeller pump was placed via the right femoral access  point. Eventually, I dilated to a 14-Yoruba sheath with serial  dilations. The Impella device was placed under fluoroscopic guidance  and positioning was confirmed to be adequate. The device signals were  proper and augmentation was occurring at 3.2-3.5 liters per minute at  the completion of the procedure. This device was sutured into position. CONCLUSION:  1. Successful thrombus extraction with a Penumbra CAT RX device over a  BMW guidewire into the ostial LAD.   Then this lesion which was 75%  residual stenosis was stented with a 3.5 mm x 15-mm length Vision

## 2019-07-05 NOTE — PROGRESS NOTES
Trach care completed at this time. Inner cannula removed and cleaned, drain sponges removed and changed. Sx for a moderate amount of thick tan/ dark red secretions. Reinier Crowley is still sutured.    Electronically signed by Sole Blancas on 7/5/2019 at 8:12 AM

## 2019-07-05 NOTE — CARE COORDINATION
PATIENT HAS BEEN APPROVED TO TRANSFER TO [address] WHEN MEDICALLY STABLE.   [place], [place]     Case Management   [phone]    7/5/2019  4:21 PM

## 2019-07-05 NOTE — PROGRESS NOTES
15 mL Oral Daily    scopolamine  1 patch Transdermal Q72H    levETIRAcetam  1,000 mg Oral BID    divalproex  500 mg Oral 3 times per day    ticagrelor  90 mg Oral BID    enoxaparin  40 mg Subcutaneous Daily    insulin lispro  0-6 Units Subcutaneous Q4H    carvedilol  6.25 mg Oral BID WC    chlorhexidine  15 mL Mouth/Throat BID    aspirin  81 mg Oral Daily    sodium chloride flush  10 mL Intravenous 2 times per day    famotidine  20 mg Oral BID      dextrose       ibuprofen, ondansetron, glucose, dextrose, glucagon (rDNA), dextrose, sodium chloride flush, acetaminophen, magnesium hydroxide    Lab Data:  CBC:   Recent Labs     07/03/19  0426 07/04/19  0447 07/04/19  1507 07/05/19  0507   WBC 14.6* 14.4*  --  18.6*   HGB 7.8* 7.4* 8.3* 7.0*   * 576*  --  766*     BMP:    Recent Labs     07/03/19  0426 07/04/19  0447    142   K 3.8 4.0   CO2 24 25   BUN 16 16   CREATININE 0.7* 0.7*     LIVR:   Recent Labs     07/04/19  0447   *   ALT 74*     Echo 6/21/2019:  Left ventricular cavity size is normal.  LVEF 30%  Viability of the basal anterorlateral and anterior myocardium. Cardiac cath with PCI 6/21/2019:  1. Successful thrombus extraction with a [employer] CAT RX device over a BMW guidewire into the ostial LAD. Then this lesion which was 75%  residual stenosis was stented with a 3.5 mm x 15-mm length Vision bare metal stent. The stent was deployed at 14 atmospheres x30 seconds with  AFSANEH 3 flow and nearly 0% residual stenosis. 2.  The right coronary artery, left main, and circumflex are all within normal limits. 3.  LV ejection fraction 25% with pulmonary edema. LVEDP 40 mmHg. LVEF 25%. 4.  Insertion of a short-term external heart assist system into the heart via the right femoral percutaneous approach using a 14-Vietnamese  sheath. This was done so as to provide assistance with cardiac output using this device which was the Impeller pump continuously.     Telemetry: Sinus rhythm-sinus

## 2019-07-05 NOTE — CARE COORDINATION
Call placed to [employer] Dotty Franco   [phone] who reports his precert still remains pending. They insurance company was closed yesterday. Dotty Valladares has updated the precert. Bed is available at [facility]. Report:   [phone]  Fax:   [phone]. Sen Covert is on call over the weekend, but states if nothing is heard today he will not be approved over the weekend as insurance is closed.   [place], [place]     Case Management   [phone]    7/5/2019  12:39 PM

## 2019-07-06 VITALS
RESPIRATION RATE: 21 BRPM | DIASTOLIC BLOOD PRESSURE: 80 MMHG | HEART RATE: 106 BPM | SYSTOLIC BLOOD PRESSURE: 117 MMHG | TEMPERATURE: 97.2 F | WEIGHT: 177.69 LBS | HEIGHT: 74 IN | BODY MASS INDEX: 22.8 KG/M2 | OXYGEN SATURATION: 100 %

## 2019-07-06 LAB
CULTURE NOSE: NORMAL
GLUCOSE BLD-MCNC: 103 MG/DL (ref 70–99)
GLUCOSE BLD-MCNC: 118 MG/DL (ref 70–99)
GLUCOSE BLD-MCNC: 126 MG/DL (ref 70–99)
GLUCOSE BLD-MCNC: 147 MG/DL (ref 70–99)
PERFORMED ON: ABNORMAL
PROCALCITONIN: 0.27 NG/ML (ref 0–0.15)

## 2019-07-06 PROCEDURE — 6370000000 HC RX 637 (ALT 250 FOR IP): Performed by: SURGERY

## 2019-07-06 PROCEDURE — 85025 COMPLETE CBC W/AUTO DIFF WBC: CPT

## 2019-07-06 PROCEDURE — 94761 N-INVAS EAR/PLS OXIMETRY MLT: CPT

## 2019-07-06 PROCEDURE — 6360000002 HC RX W HCPCS: Performed by: NURSE PRACTITIONER

## 2019-07-06 PROCEDURE — 2700000000 HC OXYGEN THERAPY PER DAY

## 2019-07-06 PROCEDURE — 6370000000 HC RX 637 (ALT 250 FOR IP): Performed by: INTERNAL MEDICINE

## 2019-07-06 PROCEDURE — 2580000003 HC RX 258: Performed by: SURGERY

## 2019-07-06 PROCEDURE — 99232 SBSQ HOSP IP/OBS MODERATE 35: CPT | Performed by: NURSE PRACTITIONER

## 2019-07-06 PROCEDURE — 6370000000 HC RX 637 (ALT 250 FOR IP): Performed by: NURSE PRACTITIONER

## 2019-07-06 PROCEDURE — 36415 COLL VENOUS BLD VENIPUNCTURE: CPT

## 2019-07-06 RX ORDER — PEDIATRIC MULTIPLE VITAMIN LIQ
15 LIQUID ORAL DAILY
Refills: 0 | DISCHARGE
Start: 2019-07-07

## 2019-07-06 RX ORDER — DIVALPROEX SODIUM 125 MG/1
500 CAPSULE, COATED PELLETS ORAL EVERY 8 HOURS SCHEDULED
Qty: 60 CAPSULE | Refills: 3 | DISCHARGE
Start: 2019-07-06

## 2019-07-06 RX ORDER — SCOLOPAMINE TRANSDERMAL SYSTEM 1 MG/1
1 PATCH, EXTENDED RELEASE TRANSDERMAL
DISCHARGE
Start: 2019-07-06

## 2019-07-06 RX ORDER — ASPIRIN 81 MG/1
81 TABLET, CHEWABLE ORAL DAILY
Qty: 30 TABLET | Refills: 3 | DISCHARGE
Start: 2019-07-07

## 2019-07-06 RX ORDER — FAMOTIDINE 20 MG/1
20 TABLET, FILM COATED ORAL 2 TIMES DAILY
Qty: 60 TABLET | Refills: 3 | DISCHARGE
Start: 2019-07-06

## 2019-07-06 RX ORDER — LACTOBACILLUS RHAMNOSUS GG 10B CELL
2 CAPSULE ORAL 2 TIMES DAILY WITH MEALS
Status: DISCONTINUED | OUTPATIENT
Start: 2019-07-06 | End: 2019-07-06 | Stop reason: HOSPADM

## 2019-07-06 RX ORDER — LEVOFLOXACIN 5 MG/ML
500 INJECTION, SOLUTION INTRAVENOUS EVERY 24 HOURS
Qty: 1000 ML | DISCHARGE
Start: 2019-07-06

## 2019-07-06 RX ORDER — CARVEDILOL 6.25 MG/1
6.25 TABLET ORAL 2 TIMES DAILY WITH MEALS
Qty: 60 TABLET | Refills: 3 | DISCHARGE
Start: 2019-07-06

## 2019-07-06 RX ORDER — LACTOBACILLUS RHAMNOSUS GG 10B CELL
2 CAPSULE ORAL 2 TIMES DAILY WITH MEALS
Qty: 30 CAPSULE | DISCHARGE
Start: 2019-07-06

## 2019-07-06 RX ORDER — CHLORHEXIDINE GLUCONATE 0.12 MG/ML
15 RINSE ORAL 2 TIMES DAILY
Qty: 420 ML | Refills: 0 | DISCHARGE
Start: 2019-07-06 | End: 2019-07-20

## 2019-07-06 RX ORDER — NICOTINE POLACRILEX 4 MG
15 LOZENGE BUCCAL PRN
Qty: 45 G | Refills: 1 | DISCHARGE
Start: 2019-07-06

## 2019-07-06 RX ORDER — LEVETIRACETAM 100 MG/ML
1000 SOLUTION ORAL 2 TIMES DAILY
Refills: 3 | DISCHARGE
Start: 2019-07-06

## 2019-07-06 RX ADMIN — LEVOFLOXACIN 500 MG: 5 INJECTION, SOLUTION INTRAVENOUS at 11:19

## 2019-07-06 RX ADMIN — DIVALPROEX SODIUM 500 MG: 125 CAPSULE ORAL at 06:30

## 2019-07-06 RX ADMIN — Medication 10 ML: at 08:06

## 2019-07-06 RX ADMIN — INSULIN LISPRO 1 UNITS: 100 INJECTION, SOLUTION INTRAVENOUS; SUBCUTANEOUS at 11:34

## 2019-07-06 RX ADMIN — DIVALPROEX SODIUM 500 MG: 125 CAPSULE ORAL at 14:20

## 2019-07-06 RX ADMIN — LEVETIRACETAM 1000 MG: 500 SOLUTION ORAL at 08:05

## 2019-07-06 RX ADMIN — FAMOTIDINE 20 MG: 20 TABLET ORAL at 08:05

## 2019-07-06 RX ADMIN — CHLORHEXIDINE GLUCONATE 0.12% ORAL RINSE 15 ML: 1.2 LIQUID ORAL at 08:05

## 2019-07-06 RX ADMIN — Medication 2 CAPSULE: at 08:05

## 2019-07-06 RX ADMIN — TICAGRELOR 90 MG: 90 TABLET ORAL at 08:05

## 2019-07-06 RX ADMIN — ENOXAPARIN SODIUM 40 MG: 40 INJECTION SUBCUTANEOUS at 08:05

## 2019-07-06 RX ADMIN — CARVEDILOL 6.25 MG: 6.25 TABLET, FILM COATED ORAL at 08:05

## 2019-07-06 RX ADMIN — Medication 15 ML: at 08:05

## 2019-07-06 RX ADMIN — ASPIRIN 81 MG 81 MG: 81 TABLET ORAL at 08:05

## 2019-07-06 ASSESSMENT — PAIN SCALES - GENERAL
PAINLEVEL_OUTOF10: 0

## 2019-07-06 NOTE — PLAN OF CARE
Nutrition Problem: Inadequate oral intake  Intervention: Food and/or Nutrient Delivery: Continue current Tube Feeding  Nutritional Goals: tolerate most appropriate form of nutrition
Problem: Airway Clearance - Ineffective:  Goal: Ability to maintain a clear airway will improve  Description  Ability to maintain a clear airway will improve  Outcome: Ongoing  A: Respiratory assessment. HOB elevated to at least 30 degrees. ETT and oral suctioning PRN. Oral care every 2 hours. Problem: Risk for Impaired Skin Integrity  Goal: Tissue integrity - skin and mucous membranes  Description  Structural intactness and normal physiological function of skin and  mucous membranes. Outcome: Ongoing  A: Skin assessment. Turn/ reposition every 2 hours and PRN. Use the wedge foam when being repositioned in bed. Preventative Mepilex border on sacrum, elbows and heels. Bilateral heel protectors on. Check for incontinence at least every 2 hours. Use barrier cream cloths for brittany care. Low air loss and alternating pressure-relief mattress. Problem: Falls - Risk of:  Goal: Will remain free from falls  Description  Will remain free from falls  Outcome: Ongoing  A: Fall assessment. Intentional rounding. Bed alarm activated.     Electronically signed by Nadine Ramirez RN on 6/27/2019 at 1:41 AM
Problem: Nutrition  Goal: Optimal nutrition therapy  6/27/2019 2215 by Jhonny Cline RN  Outcome: Ongoing  Note:   Pt on Tube feeding at  50ml/hr with 250ml free water bolus, so far tolerating well, 40ml residual.     Problem: Risk for Impaired Skin Integrity  Goal: Tissue integrity - skin and mucous membranes  Description  Structural intactness and normal physiological function of skin and  mucous membranes.   Outcome: Ongoing  Intervention: PRESSURE ULCER PREVENTION  Note:   Pt repositioned Q 2hrs, wedge pillow used for support     Problem: Tissue Perfusion - Cardiopulmonary, Altered:  Goal: Absence of angina  Description  Absence of angina  Outcome: Met This Shift
Problem: Risk for Impaired Skin Integrity  Goal: Tissue integrity - skin and mucous membranes  7/2/2019 1942 by Lyle Odonnell, RN  Note:   Skin assessment complete, see flowsheet. Pt turned in bed q2h and as needed. Pt encouraged to change positions frequently. Sacral border in place, peeled back and skin WNL. Pt checked for incontinence and brittany care completed frequently. Moisture barrier/JUNE wipes applied to brittany area. Education given on importance of skin care, frequent turns, and moisture protection, family verbalized understanding. Will continue to monitor. Problem: Airway Clearance - Ineffective:  Goal: Ability to maintain a clear airway will improve  Note:   Remains with trach, secretions suctioned as necessary. Problem: Fluid Volume - Imbalance:  Goal: Absence of imbalanced fluid volume signs and symptoms  Note:   Strict intake and output monitored. Tolerating tube feed well. Problem: Gas Exchange - Impaired:  Goal: Levels of oxygenation will improve  7/2/2019 1942 by Lyle Odonnell, RN  Note:   Placed on SPONT trial, tolerating well, secretions suctioned as required.
Problem: Risk for Impaired Skin Integrity  Goal: Tissue integrity - skin and mucous membranes  Description  Structural intactness and normal physiological function of skin and  mucous membranes.   Outcome: Met This Shift     Problem: Falls - Risk of:  Goal: Will remain free from falls  Description  Will remain free from falls  Outcome: Met This Shift  Goal: Absence of physical injury  Description  Absence of physical injury  Outcome: Met This Shift     Problem: Nutrition  Goal: Optimal nutrition therapy  Outcome: Not Met This Shift
Problem: Risk for Impaired Skin Integrity  Goal: Tissue integrity - skin and mucous membranes  Description  Structural intactness and normal physiological function of skin and  mucous membranes. 7/4/2019 0728 by Ruba Kwan RN  Outcome: Met This Shift  Note:   No new problems with skin identified overnight. Turned q2H to relieve pressure over bony prominences. 7/3/2019 1955 by Jesus Madrigal RN  Note:   Skin assessment complete, see flowsheet. Pt turned in bed q2h and as needed. Pt encouraged to change positions frequently. Sacral border in place, peeled back and skin WNL. Pt checked for incontinence and brittany care completed frequently. Moisture barrier/JUNE wipes applied to brittany area. Education given on importance of skin care, frequent turns, and moisture protection, family verbalized understanding. Will continue to monitor. Problem: Falls - Risk of:  Goal: Will remain free from falls  Description  Will remain free from falls  Outcome: Met This Shift  Goal: Absence of physical injury  Description  Absence of physical injury  Outcome: Met This Shift     Problem: Airway Clearance - Ineffective:  Goal: Ability to maintain a clear airway will improve  Description  Ability to maintain a clear airway will improve  Outcome: Met This Shift     Problem: Gas Exchange - Impaired:  Goal: Levels of oxygenation will improve  Description  Levels of oxygenation will improve  7/3/2019 1955 by Jesus Madrigal RN  Note:   Patient placed on t-piece, tolerating well.
improve  7/6/2019 0704 by Melisa Knowles RN  Outcome: Ongoing     Problem: Skin Integrity - Impaired:  Goal: Will show no infection signs and symptoms  Description  Will show no infection signs and symptoms  7/6/2019 0704 by Melisa Knowles RN  Outcome: Ongoing  Goal: Absence of new skin breakdown  Description  Absence of new skin breakdown  7/6/2019 1037 by Song Otero RN  Outcome: Ongoing  7/6/2019 0704 by Melisa Knowles RN  Outcome: Ongoing     Problem: Tissue Perfusion - Cardiopulmonary, Altered:  Goal: Absence of angina  Description  Absence of angina  7/6/2019 0704 by Melisa Knowles RN  Outcome: Ongoing  Goal: Hemodynamic stability will improve  Description  Hemodynamic stability will improve  7/6/2019 0704 by Melisa Knowles RN  Outcome: Ongoing

## 2019-07-06 NOTE — CARE COORDINATION
Spoke with RN regarding possible dc today pending MD clearance and discharge order. Transportation arranged to [address] location for 3pm.  Report:   [facility]  Fax:  [phone]    VM left with Tina Rubén ([phone]) at [address] [place] [address]. VM left with wife Gabbie ([phone]) requesting call back. Message to MD requesting STAN be signed if dc today. Electronically signed by LISSA Marion on 7/6/2019 at 9:07 AM      Wife and bedside RN aware.     Electronically signed by LISSA Marion on 7/6/2019 at 9:22 AM

## 2019-07-06 NOTE — PROGRESS NOTES
[address]   Daily Progress Note      Admit Date:  6/20/2019    Reason for follow up visit: S/P cardiac arrest    CC: Unresponsive    52 y/o male admitted to [facility] after suffering a cardiac arrest and was found to have an acute anterolateral MI. He underwent PCI to his LAD occlusion with placement of an Impella. Life support devices have been withdrawn at family's request and he persists in a vegetative state. He remains unresponsive and has trach and PEG in place. Awaiting placement at [facility], [place]    Interval History:  Pt. seen and examined; records reviewed  Remains unresponsive  Remains in SR-ST  BP stable  Hgb 8.7 today    Subjective:  Pt with no acute overnight cardiac events. Review of Systems:   Pt unresponsive    Objective:   /85   Pulse 113   Temp 98.9 °F (37.2 °C) (Temporal)   Resp 20   Ht 6' 2\" (1.88 m)   Wt 177 lb 11.1 oz (80.6 kg)   SpO2 96%   BMI 22.81 kg/m²       Intake/Output Summary (Last 24 hours) at 7/6/2019 0919  Last data filed at 7/6/2019 0752  Gross per 24 hour   Intake 1670 ml   Output 1955 ml   Net -285 ml     Wt Readings from Last 3 Encounters:   07/05/19 177 lb 11.1 oz (80.6 kg)   02/18/16 180 lb 1.6 oz (81.7 kg)   12/09/15 178 lb 12.8 oz (81.1 kg)       Physical Exam:  General: Unresponsive. Skin:  Warm and dry. No new appearing rashes or lesions. Neck:  + trach in place. No JVD appreciated. Chest: Lungs clear to auscultation. No wheezes/rhonchi/rales  Cardiovascular:  Regular and tachycardic. Normal S1 and S2. No murmur/gallop or rub  Abdomen:  soft, nontender, nondistended, +bowel sounds. Extremities:  No LE edema. No clubbing or cyanosis. 2+ bilateral radial/DP/PT pulses. Cap refill brisk.     Medications:    lactobacillus  2 capsule Oral BID WC    levofloxacin  500 mg Intravenous Q24H    multivitamin  15 mL Oral Daily    scopolamine  1 patch Transdermal Q72H    levETIRAcetam  1,000 mg Oral BID    divalproex  500 mg Oral 3 times per day    25-30%  -underlying ischemic cardiomyopathy  -continue ASA, carvedilol and Brilinta  -no statin d/t elevated LFT's     3. Anoxic brain injury  -remains unresponsive     4. S/P trach and PEG     5. Anemia  -Hgb improved today  -Rx per Primary team    Stable from cardiac standpoint and ok to transfer to long term care when ok with admitting MD.    Continue ASA 81 mg daily, Carvedilol 6.25 mg BID, and Brilinta 90 mg BID    Thanks for letting us participate in [facility]. [facility].  .           Electronically signed by ALEJANDRA Carranza CNP on 7/6/2019 at 9:19 AM

## 2019-07-06 NOTE — DISCHARGE SUMMARY
Hospitalist Discharge Summary     Brittany Green  : 1970  MRN: [id number]    Admit date: 2019  Discharge date: 2019    Admitting Physician: Conrad Andrade MD    Discharge Diagnoses:   Patient Active Problem List   Diagnosis    Mixed hyperlipidemia    Leukopenia    [facility]    ST elevation myocardial infarction (STEMI) ([facility]    Pulmonary edema cardiac cause ([facility]    Cardiogenic shock ([facility]    Ventricular fibrillation ([address]    Anoxic encephalopathy ([address]    Acute respiratory failure with hypoxia (HCC)    Acute pulmonary edema (HCC)    Metabolic acidosis    Lactic acidosis    YURI (acute kidney injury) ([facility]    Transaminitis    STEMI involving left anterior descending coronary artery ([facility]    Acute systolic heart failure ([facility]    Ischemic cardiomyopathy    Bronchopneumonia due to Klebsiella pneumoniae ([facility]    Staphylococcus aureus bronchitis       Admission Condition: critical    Discharged Condition: stable    Discharge Exam:  VITALS:  /85   Pulse 113   Temp 98.9 °F (37.2 °C) (Temporal)   Resp 20   Ht 6' 2\" (1.88 m)   Wt 177 lb 11.1 oz (80.6 kg)   SpO2 96%   BMI 22.81 kg/m²   CONSTITUTIONAL:  Unconscious, no apparent distress, and appears stated age  EYES:  Lids and lashes normal, PERRL, EOMI, sclera clear, conjunctiva normal  ENT:  NC/AT, MMM    NECK:  Tracheostomy in place. Supple, symmetrical, trachea midline, no adenopathy  HEMATOLOGIC/LYMPHATICS:  no cervical, supraclavicular or axillary lymphadenopathy  LUNGS:  clear to auscultation bilaterally, No increased work of breathing, good air exchange, no crackles or wheezing  CARDIOVASCULAR:  Regular rate and rhythm, normal S1 and S2, no S3 or S4, and no significant murmurs, rubs or gallops noted. Normal apical impulse,   ABDOMEN:  PEG in place. Normal active bowel sounds, soft, non-tender, non-distended, no masses palpated, no organomegally  MUSCULOSKELETAL:  Full range of motion noted.      NEUROLOGIC: Alert, oriented x 0. Cranial nerves II-XII are grossly intact. SKIN:  normal skin color, texture, turgor for age. Hospital Course:     V fib cardiac Arrest with STEMI. S/p cath with BMS PCI  - Cardiology following  - Asa bb brilinta      Citrobacter pneumonia  Levaquin      Tremors  -EEG performed and showed severe bihemispheric suppression consistent with a severe encephalopathy, typically seen in severe generalized brain injury, often with anoxic brain injury  -continue valproate and Keppra        Acute Resp Failure with Hypoxia. S/p trach and PEG placement 7/1   on trach shield at 28% fio2 today  - off sedation   - TFs  - bronch 07/05/2019 evidence of suction trauma at main tarun        Anemia  - On MVI     Acute Metabolic Encephalopathy 2/2 anoxic brain injury  - poor prognosis  - palliative on board     Metabolic/Lactic Acidosis; resolved  - s/p bicarb        Consults: cardiology, pulmonary/intensive care, GI, neurology and general surgery    Imaging Studies:  Ct Head Wo Contrast    Result Date: 6/23/2019  EXAMINATION: CT OF THE HEAD WITHOUT CONTRAST  6/23/2019 9:48 am TECHNIQUE: CT of the head was performed without the administration of intravenous contrast. Dose modulation, iterative reconstruction, and/or weight based adjustment of the mA/kV was utilized to reduce the radiation dose to as low as reasonably achievable. COMPARISON: None. HISTORY: ORDERING SYSTEM PROVIDED HISTORY: checking for diffuse cerebral edema, anoxic brain injury TECHNOLOGIST PROVIDED HISTORY: Has a \"code stroke\" or \"stroke alert\" been called? ->No Ordering Physician Provided Reason for Exam: checking for diffuse cerebral edema, anoxic brain injury Acuity: Acute Type of Exam: Subsequent/Follow-up FINDINGS: BRAIN/VENTRICLES: There is no hemorrhage. There is poor gray-white differentiation especially in the hemispheric regions. There is sulcal effacement compatible with brain swelling. Ventricles are centrally stable. in pulmonary edema. Xr Chest Portable    Result Date: 6/20/2019  EXAMINATION: ONE XRAY VIEW OF THE CHEST 6/20/2019 7:26 pm COMPARISON: None. HISTORY: ORDERING SYSTEM PROVIDED HISTORY: code intubated TECHNOLOGIST PROVIDED HISTORY: Reason for exam:->code intubated Ordering Physician Provided Reason for Exam: code intubated, et tube placement Acuity: Acute Type of Exam: Initial FINDINGS: The cardiac silhouette and mediastinal contours are normal.  The endotracheal tube tip is located approximately 3.5 cm above the tarun. There is diffuse pulmonary edema. Transcutaneous pacing wires are noted. No pleural effusion or pneumothorax. 1. Diffuse pulmonary edema. 2. Endotracheal tube tip is located 3.5 cm above the tarun.        Other Significant Diagnostic Studies: As described above    Treatments: As described above    Disposition: LTAC    Discharge Medications:     Fall, New Kavitha Medication Instructions BEW:[id number]    Printed on:07/06/19 4361   Medication Information                      aspirin 81 MG chewable tablet  Take 1 tablet by mouth daily             carvedilol (COREG) 6.25 MG tablet  Take 1 tablet by mouth 2 times daily (with meals)             chlorhexidine (PERIDEX) 0.12 % solution  Take 15 mLs by mouth 2 times daily for 14 days             divalproex (DEPAKOTE SPRINKLE) 125 MG capsule  Take 4 capsules by mouth every 8 hours             famotidine (PEPCID) 20 MG tablet  Take 1 tablet by mouth 2 times daily             glucagon, rDNA, 1 MG injection  Inject 1 mg into the muscle as needed for Low blood sugar (Blood glucose less than 70 mg/dL and patient NOT ALERT or NPO and does not have IV access.)             glucose (GLUTOSE) 40 % GEL  Take 37.5 mLs by mouth as needed (Hypoglycemia)             ibuprofen (ADVIL;MOTRIN) 100 MG/5ML suspension  20 mLs by Per G Tube route every 6 hours as needed for Pain             insulin lispro (HUMALOG) 100 UNIT/ML injection vial  Inject 0-6 Units into the skin every 4 hours             lactobacillus (CULTURELLE) capsule  Take 2 capsules by mouth 2 times daily (with meals)             levETIRAcetam (KEPPRA) 100 MG/ML solution  Take 10 mLs by mouth 2 times daily             levofloxacin (LEVAQUIN) 500 MG/100ML SOLN  Infuse 100 mLs intravenously every 24 hours             Pediatric Multiple Vitamins (MULTIVITAMIN) LIQD oral solution  Take 15 mLs by mouth daily             scopolamine (TRANSDERM-SCOP) transdermal patch  Place 1 patch onto the skin every 72 hours             ticagrelor (BRILINTA) 90 MG TABS tablet  Take 1 tablet by mouth 2 times daily                 35 Minutes spent on patient evaluation, counseling and discharge planning.      Signed:  Jesica Peoples MD  7/6/2019, 9:34 AM

## 2019-07-07 LAB
CULTURE, RESPIRATORY: ABNORMAL
GRAM STAIN RESULT: ABNORMAL
ORGANISM: ABNORMAL

## 2019-07-08 LAB
ATYPICAL LYMPHOCYTE RELATIVE PERCENT: 2 % (ref 0–6)
BANDED NEUTROPHILS RELATIVE PERCENT: 1 % (ref 0–7)
BASOPHILS ABSOLUTE: 0.2 K/UL (ref 0–0.2)
BASOPHILS RELATIVE PERCENT: 1 %
EOSINOPHILS ABSOLUTE: 0 K/UL (ref 0–0.6)
EOSINOPHILS RELATIVE PERCENT: 0 %
HCT VFR BLD CALC: 26.2 % (ref 40.5–52.5)
HEMATOLOGY PATH CONSULT: NO
HEMOGLOBIN: 8.7 G/DL (ref 13.5–17.5)
LYMPHOCYTES ABSOLUTE: 1.7 K/UL (ref 1–5.1)
LYMPHOCYTES RELATIVE PERCENT: 9 %
MCH RBC QN AUTO: 31.5 PG (ref 26–34)
MCHC RBC AUTO-ENTMCNC: 33.2 G/DL (ref 31–36)
MCV RBC AUTO: 94.8 FL (ref 80–100)
METAMYELOCYTES RELATIVE PERCENT: 1 %
MONOCYTES ABSOLUTE: 2.9 K/UL (ref 0–1.3)
MONOCYTES RELATIVE PERCENT: 18 %
NEUTROPHILS ABSOLUTE: 11.1 K/UL (ref 1.7–7.7)
NEUTROPHILS RELATIVE PERCENT: 68 %
PDW BLD-RTO: 13.1 % (ref 12.4–15.4)
PLATELET # BLD: 858 K/UL (ref 135–450)
PLATELET SLIDE REVIEW: ABNORMAL
PMV BLD AUTO: 8.3 FL (ref 5–10.5)
RBC # BLD: 2.76 M/UL (ref 4.2–5.9)
RBC # BLD: NORMAL 10*6/UL
SLIDE REVIEW: ABNORMAL
WBC # BLD: 15.9 K/UL (ref 4–11)

## (undated) DEVICE — GOWN SIRUS NONREIN LG W/TWL: Brand: MEDLINE INDUSTRIES, INC.

## (undated) DEVICE — COVER LT HNDL BLU PLAS

## (undated) DEVICE — SPONGE,DISSECTOR,K,XRAY,9/16"X1/4",STRL: Brand: MEDLINE

## (undated) DEVICE — DRAPE MINOR  6IN X 6IN REINFORC FENES ADHES

## (undated) DEVICE — ELECTROSURGICAL PENCIL BUTTON SWITCH NON COATED BLADE ELECTRODE 10 FT (3 M) CORD HOLSTER: Brand: MEGADYNE

## (undated) DEVICE — BITE BLK 60FR GRN ENDOSCP AD W STRP SLD DISPOSABLE

## (undated) DEVICE — ELECTRODE PT RET AD L9FT HI MOIST COND ADH HYDRGEL CORDED

## (undated) DEVICE — KIT OR ROOM TURNOVER W/STRAP

## (undated) DEVICE — SUTURE PERMAHAND SZ 3-0 L30IN NONABSORBABLE BLK SILK BRAID A304H

## (undated) DEVICE — CANISTER, RIGID, 1200CC: Brand: MEDLINE INDUSTRIES, INC.

## (undated) DEVICE — SUTURE ETHLN SZ 2-0 L18IN NONABSORBABLE BLK L26MM FS 3/8 664G

## (undated) DEVICE — TUBING, SUCTION, 1/4" X 12', STRAIGHT: Brand: MEDLINE

## (undated) DEVICE — GLOVE ORANGE PI 7   MSG9070

## (undated) DEVICE — CATHETER SUCT TR FL TIP 14FR W/ O CTRL

## (undated) DEVICE — Device

## (undated) DEVICE — INTENDED FOR TISSUE SEPARATION, AND OTHER PROCEDURES THAT REQUIRE A SHARP SURGICAL BLADE TO PUNCTURE OR CUT.: Brand: BARD-PARKER ® STAINLESS STEEL BLADES

## (undated) DEVICE — MIC* SAFETY PERCUTANEOUS ENDOSCOPIC GASTROSTOMY PEG KIT - 20 FR - PULL: Brand: MIC PEG TUBE

## (undated) DEVICE — ENDOSCOPY KIT: Brand: MEDLINE INDUSTRIES, INC.

## (undated) DEVICE — CHLORAPREP 26ML ORANGE

## (undated) DEVICE — SOLUTION IV IRRIG POUR BRL 0.9% SODIUM CHL 2F7124

## (undated) DEVICE — MINOR SET UP PK

## (undated) DEVICE — GLOVE ORANGE PI 7 1/2   MSG9075